# Patient Record
Sex: FEMALE | Race: WHITE | NOT HISPANIC OR LATINO | Employment: OTHER | URBAN - METROPOLITAN AREA
[De-identification: names, ages, dates, MRNs, and addresses within clinical notes are randomized per-mention and may not be internally consistent; named-entity substitution may affect disease eponyms.]

---

## 2017-01-13 ENCOUNTER — COMMUNICATION - HEALTHEAST (OUTPATIENT)
Dept: HEALTH INFORMATION MANAGEMENT | Facility: CLINIC | Age: 52
End: 2017-01-13

## 2017-01-17 ENCOUNTER — TRANSFERRED RECORDS (OUTPATIENT)
Dept: HEALTH INFORMATION MANAGEMENT | Facility: CLINIC | Age: 52
End: 2017-01-17

## 2017-01-18 ENCOUNTER — TRANSFERRED RECORDS (OUTPATIENT)
Dept: HEALTH INFORMATION MANAGEMENT | Facility: CLINIC | Age: 52
End: 2017-01-18

## 2017-05-18 ENCOUNTER — TRANSFERRED RECORDS (OUTPATIENT)
Dept: HEALTH INFORMATION MANAGEMENT | Facility: CLINIC | Age: 52
End: 2017-05-18

## 2017-07-10 ENCOUNTER — PRE VISIT (OUTPATIENT)
Dept: OTOLARYNGOLOGY | Facility: CLINIC | Age: 52
End: 2017-07-10

## 2017-07-10 NOTE — TELEPHONE ENCOUNTER
1.  Date/reason for appt:  7/17/17   Nasal Polyps    2.  Referring provider:  [facility]    3.  Call to patient (Yes / No - short description):  No, referred    4.  Previous care at / records requested from:  [facility]

## 2017-07-17 ENCOUNTER — OFFICE VISIT (OUTPATIENT)
Dept: OTOLARYNGOLOGY | Facility: CLINIC | Age: 52
End: 2017-07-17

## 2017-07-17 VITALS — BODY MASS INDEX: 27.16 KG/M2 | HEIGHT: 65 IN | WEIGHT: 163 LBS

## 2017-07-17 DIAGNOSIS — J33.9 NASAL POLYP: Primary | ICD-10-CM

## 2017-07-17 RX ORDER — FLUTICASONE PROPIONATE 50 MCG
1 SPRAY, SUSPENSION (ML) NASAL
COMMUNITY
Start: 2016-10-25 | End: 2017-08-09

## 2017-07-17 ASSESSMENT — PAIN SCALES - GENERAL: PAINLEVEL: NO PAIN (0)

## 2017-07-17 NOTE — PATIENT INSTRUCTIONS
Plan of care:  We will call you with Ct results and make a plan from there  Clinic contact information:  1. To schedule an appointment call [phone], option 1  2. To talk to the Triage RN call [phone], option 3  3. If you need to speak to Myrna or get a message to your doctor on a Friday, call the triage RN  4. MyrnaRN: [phone]  5. Surgery scheduling:      Joselin Hernandez: [phone]      Shasta Torres: [phone]  6. Fax: [phone]  7. [phone]

## 2017-07-17 NOTE — MR AVS SNAPSHOT
After Visit Summary   2017    Katey Allen    MRN: [id number]           Patient Information     Date Of Birth          1965        Visit Information        Provider Department      2017 5:00 PM Dorothy Garcia MD [facility] Ear Nose and Throat        Today's Diagnoses     Nasal polyp    -  1      Care Instructions    Plan of care:  We will call you with Ct results and make a plan from there  Clinic contact information:  1. To schedule an appointment call [phone], option 1  2. To talk to the Triage RN call [phone], option 3  3. If you need to speak to Myrna or get a message to your doctor on a Friday, call the triage RN  4. MyrnaRN: [phone]  5. Surgery scheduling:      Joselin Hernandez: [phone]      Shasta Torres: [phone]  6. Fax: [phone]  7. [phone]            Follow-ups after your visit        Your next 10 appointments already scheduled     2017  6:00 PM CDT   CT MAXILLOFACIAL W/O CONTRAST with UCCT1   [facility] CT ([facility])    [address] [address] [phone]           Please bring any scans or X-rays taken at other hospitals, if similar tests were done. Also bring a list of your medicines, including vitamins, minerals and over-the-counter drugs. It is safest to leave personal items at home.  Be sure to tell your doctor:   If you have any allergies.   If there s any chance you are pregnant.   If you are breastfeeding.   If you have any special needs.  You do not need to do anything special to prepare.  Please wear loose clothing, such as a sweat suit or jogging clothes. Avoid snaps, zippers and other metal. We may ask you to undress and put on a hospital gown.              Future tests that were ordered for you today     Open Future Orders        Priority Expected Expires Ordered    CT Maxillofacial w/o contrast Routine  2018           "  Who to contact     Please call your clinic at [phone] to:    Ask questions about your health    Make or cancel appointments    Discuss your medicines    Learn about your test results    Speak to your doctor   If you have compliments or concerns about an experience at your clinic, or if you wish to file a complaint, please contact [facility] Patient Relations at [phone] or email us at [email or web address]         Additional Information About Your Visit        Personal Style Finder Information     Personal Style Finder is an electronic gateway that provides easy, online access to your medical records. With Personal Style Finder, you can request a clinic appointment, read your test results, renew a prescription or communicate with your care team.     To sign up for Personal Style Finder visit the website at [email or web address]   You will be asked to enter the access code listed below, as well as some personal information. Please follow the directions to create your username and password.     Your access code is: [email or web address]  Expires: 10/1/2017  6:30 AM     Your access code will  in 90 days. If you need help or a new code, please contact your [facility] Clinic or call [phone] for assistance.        Care EveryWhere ID     This is your Care EveryWhere ID. This could be used by other organizations to access your [place] medical records  [id number]        Your Vitals Were     Height BMI (Body Mass Index)                1.651 m (5' 5\") 27.12 kg/m2           Blood Pressure from Last 3 Encounters:   No data found for BP    Weight from Last 3 Encounters:   17 73.9 kg (163 lb)               Primary Care Provider    None Specified       No primary provider on file.        Equal Access to Services     MAKAYLA HOLLEY : Nicolette Jensen, viviana francisco, jonn ratliff. So [facility] [phone].    ATENCIÓN: Si kasia de jesus, " tiene a colunga disposición servicios gratuitos de asistencia lingüística. [place] [phone].    We comply with applicable federal civil rights laws and [place] laws. We do not discriminate on the basis of race, color, national origin, age, disability sex, sexual orientation or gender identity.            Thank you!     Thank you for choosing [facility] EAR NOSE AND TH[facility]AT  for your care. Our goal is always to provide you with excellent care. Hearing back from our patients is one way we can continue to improve our services. Please take a few minutes to complete the written survey that you may receive in the mail after your visit with us. Thank you!             Your Updated Medication List - Protect others around you: Learn how to safely use, store and throw away your medicines at www.disposemymeds.org.          This list is accurate as of: 7/17/17  5:43 PM.  Always use your most recent med list.                   Brand Name Dispense Instructions for use Diagnosis    fluticasone 50 MCG/ACT spray    FLONASE     Spray 1 spray in nostril

## 2017-07-17 NOTE — LETTER
2017       RE: Katey Allen  [address]  [place] [facility] [place] [address]     Dear Colleague,    Thank you for referring your patient, Katey Allen, to the [facility] EAR NOSE AND THROAT at [facility]. Please see a copy of my visit note below.    Otolaryngology Adult Consultation    Patient: Katey Allen   : 1965     Patient presents with:  Consult:   Chief Complaint   Patient presents with     Consult     nasal polyps        Referring Provider: Av Nazario   Consulting Physician:  Dorothy Garcia MD       Assessment/Plan: Nasal polyp on right, possibly on left.  CT to determine extent of disease.  Based on CT will recommend in office vs. OR for polypectomy and sinus surgery.     HPI: Katey Allen is a 51 year old female seen today in the Otolaryngology Clinic for a history of nasal polyps, difficulty sleeping and breathing.  Symptoms include congestion. Duration of symptoms has been 1.5 years. Previous medical therapies tried and their effects include prednisone twice with temporary effect, nasocort, zyrtec, and decongestant with partial effect. Previous surgery performed includes none. Associated lower respiratory symptoms are congestion.  Also weight gain due to reduced sleep and exercise      No current outpatient prescriptions on file prior to visit.  No current facility-administered medications on file prior to visit.        Allergies   Allergen Reactions     Penicillins        No past medical history on file.      Review of Systems   ENT ROS 2017   Constitutional Weight gain, Unexplained fatigue, Problems with sleep, Unexplained fever or night sweats   Neurology Unexplained weakness, Headache   Ears, Nose, Throat Hearing loss, Ear pain   Musculoskeletal Sore or stiff joints, Swollen legs/feet   Allergy/Immunology Allergies or hay fever        14 point ROS neg other than the symptoms noted above.    Physical Exam:    General Assessment   The  patient is alert, oriented and in no acute distress.   Head/Face/Scalp  Grossly normal.   Ears  Normal canals, auricles and tympanic membranes.   Nose  External nose is straight, skin is normal. Intranasal exam (anterior rhinoscopy) reveals normal moist mucosa, turbinate tissue without edema, erythema or crusting.  Septum mainly in the midline.  Large polyps eminating from R middle meatus, septum deviated, hard to see left to see if there are polyps  Mouth  Oral cavity shows healthy mucosa with out ulceration, masses or other lesions involving the tongue, palate, buccal mucosa, floor of mouth or gingiva.  Dentition in good repair.  Oropharynx with normal tonsils, posterior wall and base of tongue.  Neck  No significant adenopathy, thyroid or salivary gland abnormality.       Again, thank you for allowing me to participate in the care of your patient.      Sincerely,    Dorothy Garcia MD

## 2017-07-17 NOTE — NURSING NOTE
Chief Complaint   Patient presents with     Consult     nasal polyps     Caitlin Bear Medical Assistant

## 2017-07-19 ENCOUNTER — CARE COORDINATION (OUTPATIENT)
Dept: OTOLARYNGOLOGY | Facility: CLINIC | Age: 52
End: 2017-07-19

## 2017-07-19 NOTE — PROGRESS NOTES
CT reviewed by Dr Garcia:  Polyps on both sides, affecting deeper sinus tissue, would recommend a procedure in the OR.   Message left on patient's voicemail, asking for callback.

## 2017-07-19 NOTE — PROGRESS NOTES
Otolaryngology Adult Consultation    Patient: Katey Allen   : 1965     Patient presents with:  Consult:   Chief Complaint   Patient presents with     Consult     nasal polyps        Referring Provider: Av Nazario   Consulting Physician:  Dorothy Garcia MD       Assessment/Plan: Nasal polyp on right, possibly on left.  CT to determine extent of disease.  Based on CT will recommend in office vs. OR for polypectomy and sinus surgery.     HPI: Katey Allen is a 51 year old female seen today in the Otolaryngology Clinic for a history of nasal polyps, difficulty sleeping and breathing.  Symptoms include congestion. Duration of symptoms has been 1.5 years. Previous medical therapies tried and their effects include prednisone twice with temporary effect, nasocort, zyrtec, and decongestant with partial effect. Previous surgery performed includes none. Associated lower respiratory symptoms are congestion.  Also weight gain due to reduced sleep and exercise      No current outpatient prescriptions on file prior to visit.  No current facility-administered medications on file prior to visit.        Allergies   Allergen Reactions     Penicillins        No past medical history on file.      Review of Systems   ENT ROS 2017   Constitutional Weight gain, Unexplained fatigue, Problems with sleep, Unexplained fever or night sweats   Neurology Unexplained weakness, Headache   Ears, Nose, Throat Hearing loss, Ear pain   Musculoskeletal Sore or stiff joints, Swollen legs/feet   Allergy/Immunology Allergies or hay fever        14 point ROS neg other than the symptoms noted above.    Physical Exam:    General Assessment   The patient is alert, oriented and in no acute distress.   Head/Face/Scalp  Grossly normal.   Ears  Normal canals, auricles and tympanic membranes.   Nose  External nose is straight, skin is normal. Intranasal exam (anterior rhinoscopy) reveals normal moist mucosa, turbinate tissue without  edema, erythema or crusting.  Septum mainly in the midline.  Large polyps eminating from R middle meatus, septum deviated, hard to see left to see if there are polyps  Mouth  Oral cavity shows healthy mucosa with out ulceration, masses or other lesions involving the tongue, palate, buccal mucosa, floor of mouth or gingiva.  Dentition in good repair.  Oropharynx with normal tonsils, posterior wall and base of tongue.  Neck  No significant adenopathy, thyroid or salivary gland abnormality.

## 2017-07-20 DIAGNOSIS — J32.4 CHRONIC PANSINUSITIS: Primary | ICD-10-CM

## 2017-07-25 ENCOUNTER — TELEPHONE (OUTPATIENT)
Dept: OTOLARYNGOLOGY | Facility: CLINIC | Age: 52
End: 2017-07-25

## 2017-07-25 NOTE — TELEPHONE ENCOUNTER
7/21/17 Left msg for call back     7/25/17  Left 2nd msg for call back    7/26/17 return call to patient and scheduled surgery for 8/11/17 with Dr Garcia

## 2017-07-26 ENCOUNTER — COMMUNICATION - HEALTHEAST (OUTPATIENT)
Dept: FAMILY MEDICINE | Facility: CLINIC | Age: 52
End: 2017-07-26

## 2017-07-28 ENCOUNTER — OFFICE VISIT - HEALTHEAST (OUTPATIENT)
Dept: FAMILY MEDICINE | Facility: CLINIC | Age: 52
End: 2017-07-28

## 2017-07-28 DIAGNOSIS — J31.0 CHRONIC RHINITIS: ICD-10-CM

## 2017-07-28 DIAGNOSIS — Z01.818 PREOPERATIVE EXAMINATION: ICD-10-CM

## 2017-07-28 ASSESSMENT — MIFFLIN-ST. JEOR: SCORE: 1335.94

## 2017-08-09 ENCOUNTER — ALLIED HEALTH/NURSE VISIT (OUTPATIENT)
Dept: SURGERY | Facility: CLINIC | Age: 52
End: 2017-08-09

## 2017-08-09 RX ORDER — PSEUDOEPHEDRINE HCL 120 MG/1
120 TABLET, FILM COATED, EXTENDED RELEASE ORAL DAILY
COMMUNITY
End: 2017-10-04

## 2017-08-09 RX ORDER — CETIRIZINE HYDROCHLORIDE 10 MG/1
10 TABLET ORAL DAILY
COMMUNITY
End: 2023-05-12 | Stop reason: ALTCHOICE

## 2017-08-09 RX ORDER — TRIAMCINOLONE ACETONIDE 55 UG/1
2 SPRAY, METERED NASAL DAILY
COMMUNITY

## 2017-08-10 ENCOUNTER — ANESTHESIA EVENT (OUTPATIENT)
Dept: SURGERY | Facility: AMBULATORY SURGERY CENTER | Age: 52
End: 2017-08-10

## 2017-08-11 ENCOUNTER — HOSPITAL ENCOUNTER (OUTPATIENT)
Facility: AMBULATORY SURGERY CENTER | Age: 52
End: 2017-08-11
Attending: OTOLARYNGOLOGY

## 2017-08-11 ENCOUNTER — ANESTHESIA (OUTPATIENT)
Dept: SURGERY | Facility: AMBULATORY SURGERY CENTER | Age: 52
End: 2017-08-11

## 2017-08-11 VITALS
HEART RATE: 60 BPM | OXYGEN SATURATION: 98 % | HEIGHT: 65 IN | DIASTOLIC BLOOD PRESSURE: 62 MMHG | SYSTOLIC BLOOD PRESSURE: 103 MMHG | WEIGHT: 163 LBS | RESPIRATION RATE: 16 BRPM | BODY MASS INDEX: 27.16 KG/M2 | TEMPERATURE: 98 F

## 2017-08-11 DIAGNOSIS — J32.4 CHRONIC PANSINUSITIS: Primary | ICD-10-CM

## 2017-08-11 RX ORDER — ECHINACEA PURPUREA EXTRACT 125 MG
1 TABLET ORAL
Qty: 1 BOTTLE | Refills: 3 | Status: SHIPPED | OUTPATIENT
Start: 2017-08-11 | End: 2023-05-12 | Stop reason: ALTCHOICE

## 2017-08-11 RX ORDER — ONDANSETRON 2 MG/ML
INJECTION INTRAMUSCULAR; INTRAVENOUS PRN
Status: DISCONTINUED | OUTPATIENT
Start: 2017-08-11 | End: 2017-08-11

## 2017-08-11 RX ORDER — ONDANSETRON 4 MG/1
4 TABLET, ORALLY DISINTEGRATING ORAL EVERY 30 MIN PRN
Status: DISCONTINUED | OUTPATIENT
Start: 2017-08-11 | End: 2017-08-12 | Stop reason: HOSPADM

## 2017-08-11 RX ORDER — FENTANYL CITRATE 50 UG/ML
25-50 INJECTION, SOLUTION INTRAMUSCULAR; INTRAVENOUS EVERY 5 MIN PRN
Status: DISCONTINUED | OUTPATIENT
Start: 2017-08-11 | End: 2017-08-11 | Stop reason: HOSPADM

## 2017-08-11 RX ORDER — OXYMETAZOLINE HYDROCHLORIDE 0.05 G/100ML
2-3 SPRAY NASAL 2 TIMES DAILY PRN
Qty: 1 BOTTLE | Refills: 0 | Status: SHIPPED | OUTPATIENT
Start: 2017-08-11 | End: 2017-10-04

## 2017-08-11 RX ORDER — SODIUM CHLORIDE, SODIUM LACTATE, POTASSIUM CHLORIDE, CALCIUM CHLORIDE 600; 310; 30; 20 MG/100ML; MG/100ML; MG/100ML; MG/100ML
INJECTION, SOLUTION INTRAVENOUS CONTINUOUS PRN
Status: DISCONTINUED | OUTPATIENT
Start: 2017-08-11 | End: 2017-08-11

## 2017-08-11 RX ORDER — HYDROCODONE BITARTRATE AND ACETAMINOPHEN 5; 325 MG/1; MG/1
1-2 TABLET ORAL EVERY 4 HOURS PRN
Qty: 30 TABLET | Refills: 0 | Status: SHIPPED | OUTPATIENT
Start: 2017-08-11 | End: 2017-10-04

## 2017-08-11 RX ORDER — MEPERIDINE HYDROCHLORIDE 25 MG/ML
12.5 INJECTION INTRAMUSCULAR; INTRAVENOUS; SUBCUTANEOUS
Status: DISCONTINUED | OUTPATIENT
Start: 2017-08-11 | End: 2017-08-12 | Stop reason: HOSPADM

## 2017-08-11 RX ORDER — PROPOFOL 10 MG/ML
INJECTION, EMULSION INTRAVENOUS PRN
Status: DISCONTINUED | OUTPATIENT
Start: 2017-08-11 | End: 2017-08-11

## 2017-08-11 RX ORDER — LIDOCAINE HYDROCHLORIDE AND EPINEPHRINE 10; 10 MG/ML; UG/ML
INJECTION, SOLUTION INFILTRATION; PERINEURAL PRN
Status: DISCONTINUED | OUTPATIENT
Start: 2017-08-11 | End: 2017-08-11 | Stop reason: HOSPADM

## 2017-08-11 RX ORDER — ONDANSETRON 2 MG/ML
4 INJECTION INTRAMUSCULAR; INTRAVENOUS EVERY 30 MIN PRN
Status: DISCONTINUED | OUTPATIENT
Start: 2017-08-11 | End: 2017-08-12 | Stop reason: HOSPADM

## 2017-08-11 RX ORDER — ACETAMINOPHEN 325 MG/1
975 TABLET ORAL ONCE
Status: DISCONTINUED | OUTPATIENT
Start: 2017-08-11 | End: 2017-08-11 | Stop reason: HOSPADM

## 2017-08-11 RX ORDER — OXYMETAZOLINE HYDROCHLORIDE 0.05 G/100ML
SPRAY NASAL PRN
Status: DISCONTINUED | OUTPATIENT
Start: 2017-08-11 | End: 2017-08-11 | Stop reason: HOSPADM

## 2017-08-11 RX ORDER — NALOXONE HYDROCHLORIDE 0.4 MG/ML
.1-.4 INJECTION, SOLUTION INTRAMUSCULAR; INTRAVENOUS; SUBCUTANEOUS
Status: DISCONTINUED | OUTPATIENT
Start: 2017-08-11 | End: 2017-08-12 | Stop reason: HOSPADM

## 2017-08-11 RX ORDER — PROPOFOL 10 MG/ML
INJECTION, EMULSION INTRAVENOUS CONTINUOUS PRN
Status: DISCONTINUED | OUTPATIENT
Start: 2017-08-11 | End: 2017-08-11

## 2017-08-11 RX ORDER — GABAPENTIN 300 MG/1
300 CAPSULE ORAL ONCE
Status: COMPLETED | OUTPATIENT
Start: 2017-08-11 | End: 2017-08-11

## 2017-08-11 RX ORDER — SODIUM CHLORIDE, SODIUM LACTATE, POTASSIUM CHLORIDE, CALCIUM CHLORIDE 600; 310; 30; 20 MG/100ML; MG/100ML; MG/100ML; MG/100ML
INJECTION, SOLUTION INTRAVENOUS CONTINUOUS
Status: DISCONTINUED | OUTPATIENT
Start: 2017-08-11 | End: 2017-08-12 | Stop reason: HOSPADM

## 2017-08-11 RX ORDER — LIDOCAINE HYDROCHLORIDE 20 MG/ML
INJECTION, SOLUTION INFILTRATION; PERINEURAL PRN
Status: DISCONTINUED | OUTPATIENT
Start: 2017-08-11 | End: 2017-08-11

## 2017-08-11 RX ORDER — FENTANYL CITRATE 50 UG/ML
INJECTION, SOLUTION INTRAMUSCULAR; INTRAVENOUS PRN
Status: DISCONTINUED | OUTPATIENT
Start: 2017-08-11 | End: 2017-08-11

## 2017-08-11 RX ORDER — DEXAMETHASONE SODIUM PHOSPHATE 10 MG/ML
INJECTION, SOLUTION INTRAMUSCULAR; INTRAVENOUS PRN
Status: DISCONTINUED | OUTPATIENT
Start: 2017-08-11 | End: 2017-08-11

## 2017-08-11 RX ORDER — FENTANYL CITRATE 50 UG/ML
25-50 INJECTION, SOLUTION INTRAMUSCULAR; INTRAVENOUS
Status: DISCONTINUED | OUTPATIENT
Start: 2017-08-11 | End: 2017-08-11 | Stop reason: HOSPADM

## 2017-08-11 RX ORDER — OXYMETAZOLINE HYDROCHLORIDE 0.05 G/100ML
2 SPRAY NASAL
Status: COMPLETED | OUTPATIENT
Start: 2017-08-11 | End: 2017-08-11

## 2017-08-11 RX ORDER — ACETAMINOPHEN 325 MG/1
975 TABLET ORAL ONCE
Status: COMPLETED | OUTPATIENT
Start: 2017-08-11 | End: 2017-08-11

## 2017-08-11 RX ORDER — HYDROCODONE BITARTRATE AND ACETAMINOPHEN 5; 325 MG/1; MG/1
1 TABLET ORAL ONCE
Status: COMPLETED | OUTPATIENT
Start: 2017-08-11 | End: 2017-08-11

## 2017-08-11 RX ORDER — GABAPENTIN 300 MG/1
300 CAPSULE ORAL ONCE
Status: DISCONTINUED | OUTPATIENT
Start: 2017-08-11 | End: 2017-08-11 | Stop reason: HOSPADM

## 2017-08-11 RX ADMIN — FENTANYL CITRATE 25 MCG: 50 INJECTION, SOLUTION INTRAMUSCULAR; INTRAVENOUS at 12:14

## 2017-08-11 RX ADMIN — PROPOFOL 200 MCG/KG/MIN: 10 INJECTION, EMULSION INTRAVENOUS at 11:22

## 2017-08-11 RX ADMIN — HYDROCODONE BITARTRATE AND ACETAMINOPHEN 1 TABLET: 5; 325 TABLET ORAL at 13:42

## 2017-08-11 RX ADMIN — DEXAMETHASONE SODIUM PHOSPHATE 4 MG: 10 INJECTION, SOLUTION INTRAMUSCULAR; INTRAVENOUS at 11:25

## 2017-08-11 RX ADMIN — Medication 10 MG: at 11:57

## 2017-08-11 RX ADMIN — SODIUM CHLORIDE, SODIUM LACTATE, POTASSIUM CHLORIDE, CALCIUM CHLORIDE: 600; 310; 30; 20 INJECTION, SOLUTION INTRAVENOUS at 11:17

## 2017-08-11 RX ADMIN — Medication 50 MG: at 11:25

## 2017-08-11 RX ADMIN — OXYMETAZOLINE HYDROCHLORIDE 2 SPRAY: 0.05 SPRAY NASAL at 10:07

## 2017-08-11 RX ADMIN — PROPOFOL 160 MG: 10 INJECTION, EMULSION INTRAVENOUS at 11:25

## 2017-08-11 RX ADMIN — FENTANYL CITRATE 50 MCG: 50 INJECTION, SOLUTION INTRAMUSCULAR; INTRAVENOUS at 11:25

## 2017-08-11 RX ADMIN — ACETAMINOPHEN 975 MG: 325 TABLET ORAL at 10:06

## 2017-08-11 RX ADMIN — GABAPENTIN 300 MG: 300 CAPSULE ORAL at 10:06

## 2017-08-11 RX ADMIN — ONDANSETRON 4 MG: 2 INJECTION INTRAMUSCULAR; INTRAVENOUS at 11:25

## 2017-08-11 RX ADMIN — LIDOCAINE HYDROCHLORIDE 100 MG: 20 INJECTION, SOLUTION INFILTRATION; PERINEURAL at 11:25

## 2017-08-11 NOTE — IP AVS SNAPSHOT
MRN:[id number]                      After Visit Summary   8/11/2017    Katey Allen    MRN: [id number]           Thank you!     Thank you for choosing [facility] for your care. Our goal is always to provide you with excellent care. Hearing back from our patients is one way we can continue to improve our services. Please take a few minutes to complete the written survey that you may receive in the mail after you visit with us. Thank you!        Patient Information     Date Of Birth          1965        About your hospital stay     You were admitted on:  August 11, 2017 You last received care in the:  [facility]    You were discharged on:  August 11, 2017       Who to Call     For medical emergencies, please call 911.  For non-urgent questions about your medical care, please call your primary care provider or clinic, [phone]  For questions related to your surgery, please call your surgery clinic        Attending Provider     Provider Specialty    Dorothy Garcia MD Otolaryngology       Primary Care Provider Office Phone # Fax #    [facility] [phone]      After Care Instructions     Diet Instructions       Resume pre procedure diet            Discharge Instructions       Patient to follow up with Dr. Garcia on 8/21/17 as previously scheduled            Discharge Instructions - Lifting restrictions       Lifting Restrictions 15 pounds for 2 weeks            No blowing nose       Nasal precaution: No nose blowing, no straining, cough/sneeze with mouth open                  Your next 10 appointments already scheduled     Aug 21, 2017  2:30 PM CDT   (Arrive by 2:15 PM)   Return Visit with Dorothy Garcia MD   [facility] Ear Nose and Throat ([facility])    [address] [place] [address] [phone]              Further instructions from your care team       [facility] Ambulatory  Surgery and Procedure Center  Home Care Following Anesthesia  For 24 hours after surgery:  1. Get plenty of rest.  A responsible adult must stay with you for at least 24 hours after you leave the surgery center.  2. Do not drive or use heavy equipment.  If you have weakness or tingling, don't drive or use heavy equipment until this feeling goes away.   3. Do not drink alcohol.   4. Avoid strenuous or risky activities.  Ask for help when climbing stairs.  5. You may feel lightheaded.  IF so, sit for a few minutes before standing.  Have someone help you get up.   6. If you have nausea (feel sick to your stomach): Drink only clear liquids such as apple juice, ginger ale, broth or 7-Up.  Rest may also help.  Be sure to drink enough fluids.  Move to a regular diet as you feel able.   7. You may have a slight fever.  Call the doctor if your fever is over 100 F (37.7 C) (taken under the tongue) or lasts longer than 24 hours.  8. You may have a dry mouth, a sore throat, muscle aches or trouble sleeping. These should go away after 24 hours.  9. Do not make important or legal decisions.   If you use hormonal birth control (such as the pill, patch, ring or implants):  You will need a second form of birth control for 7 days (condoms, a diaphragm or contraceptive foam).  While in the surgery center, you received a medicine called Sugammadex.  Hormonal birth control (such as the pill, patch, ring or implants) will not work as well for a week after taking this medicine.         Tips for taking pain medications  To get the best pain relief possible, remember these points:    Take pain medications as directed, before pain becomes severe.    Pain medication can upset your stomach: taking it with food may help.    Constipation is a common side effect of pain medication. Drink plenty of  fluids.    Eat foods high in fiber. Take a stool softener if recommended by your doctor or pharmacist.    Do not drink alcohol, drive or operate  machinery while taking pain medications.    Ask about other ways to control pain, such as with heat, ice or relaxation.    Tylenol/Acetaminophen Consumption  To help encourage the safe use of acetaminophen, the makers of TYLENOL  have lowered the maximum daily dose for single-ingredient Extra Strength TYLENOL  (acetaminophen) products sold in the [place] from 8 pills per day (4,000 mg) to 6 pills per day (3,000 mg). The dosing interval has also changed from 2 pills every 4-6 hours to 2 pills every 6 hours.    If you feel your pain relief is insufficient, you may take Tylenol/Acetaminophen in addition to your narcotic pain medication.     Be careful not to exceed 3,000 mg of Tylenol/Acetaminophen in a 24 hour period from all sources.    If you are taking extra strength Tylenol/acetaminophen (500 mg), the maximum dose is 6 tablets in 24 hours.    If you are taking regular strength acetaminophen (325 mg), the maximum dose is 9 tablets in 24 hours.    Call a doctor for any of the followin. Signs of infection (fever, growing tenderness at the surgery site, a large amount of drainage or bleeding, severe pain, foul-smelling drainage, redness, swelling).  2. It has been over 8 to 10 hours since surgery and you are still not able to urinate (pass water).  3. Headache for over 24 hours.  4. Numbness, tingling or weakness the day after surgery (if you had spinal anesthesia).  Your doctor is:  Dr. Dorothy Garcia, ENT Otolaryngology: [phone]                    Or dial [phone] and ask for the resident on call for:  ENT Otolaryngology  For emergency care, call the:  [facility]:  [phone] (TTY for hearing impaired: [phone])                  Additional Information     If you use hormonal birth control (such as the pill, patch, ring or implants): You'll need a second form of birth control for 7 days (condoms, a diaphragm or contraceptive foam). While in the hospital, you received a medicine  "called Bridion. Your normal birth control will not work as well for a week after taking this medicine.          Pending Results     No orders found from 2017 to 2017.            Admission Information     Date & Time Provider Department Dept. Phone    2017 Dorothy Garcia MD [facility] [phone]      Your Vitals Were     Blood Pressure Temperature Respirations Height Weight Pulse Oximetry    112/74 98  F (36.7  C) (Oral) 16 1.651 m (5' 5\") 73.9 kg (163 lb) 97%    BMI (Body Mass Index)                   27.12 kg/m2           MyChart Information     MemberPlanet is an electronic gateway that provides easy, online access to your medical records. With MemberPlanet, you can request a clinic appointment, read your test results, renew a prescription or communicate with your care team.     To sign up for MemberPlanet visit the website at [email or web address]   You will be asked to enter the access code listed below, as well as some personal information. Please follow the directions to create your username and password.     Your access code is: [email or web address]  Expires: 10/1/2017  6:30 AM     Your access code will  in 90 days. If you need help or a new code, please contact your [facility] or call [phone] for assistance.        Care EveryWhere ID     This is your Care EveryWhere ID. This could be used by other organizations to access your [facility] medical records  [id number]        Equal Access to Services     TAMIKO HOLLEY : Hadii maureen issa hadasho Solindyali, waaxda luqadaha, qaybta kaalmada adeegyada, waxay idiin hayaan adeeg kharash la'aan . So [facility] [phone].    ATENCIÓN: Si habla español, tiene a colunga disposición servicios gratuitos de asistencia lingüística. Llame al [phone].    We comply with applicable federal civil rights laws and [place] laws. We do not discriminate on the basis of race, color, national origin, age, disability sex, " sexual orientation or gender identity.               Review of your medicines      START taking        Dose / Directions    HYDROcodone-acetaminophen 5-325 MG per tablet   Commonly known as:  NORCO   Used for:  Chronic pansinusitis        Dose:  1-2 tablet   Take 1-2 tablets by mouth every 4 hours as needed for other (Moderate to Severe Pain)   Quantity:  30 tablet   Refills:  0       oxymetazoline 0.05 % spray   Commonly known as:  AFRIN NASAL SPRAY   Used for:  Chronic pansinusitis        Dose:  2-3 spray   Spray 2-3 sprays into both nostrils 2 times daily as needed (Nose bleed)   Quantity:  1 Bottle   Refills:  0       sodium chloride 0.65 % nasal spray   Commonly known as:  OCEAN NASAL SPRAY   Used for:  Chronic pansinusitis        Dose:  1 spray   Spray 1 spray into both nostrils every 2 hours (while awake)   Quantity:  1 Bottle   Refills:  3         CONTINUE these medicines which have NOT CHANGED        Dose / Directions    SUDAFED 12 HOUR 120 MG 12 hr tablet   Generic drug:  pseudoePHEDrine        Dose:  120 mg   Take 120 mg by mouth daily   Refills:  0       triamcinolone 55 MCG/ACT Inhaler   Commonly known as:  NASACORT        Dose:  2 spray   Spray 2 sprays into both nostrils daily   Refills:  0       ZYRTEC ALLERGY 10 MG tablet   Generic drug:  cetirizine        Dose:  10 mg   Take 10 mg by mouth daily   Refills:  0            Where to get your medicines      These medications were sent to [facility] [place], [place] - [facility] [address] [address]    Hours:  TRANSPLANT PHONE NUMBER [phone] Phone:  [phone]     oxymetazoline 0.05 % spray    sodium chloride 0.65 % nasal spray         Some of these will need a paper prescription and others can be bought over the counter. Ask your nurse if you have questions.     Bring a paper prescription for each of these medications     HYDROcodone-acetaminophen 5-325 MG per tablet                 Protect others around you: Learn how to safely use, store and throw away your medicines at www.disposemymeds.org.             Medication List: This is a list of all your medications and when to take them. Check marks below indicate your daily home schedule. Keep this list as a reference.      Medications           Morning Afternoon Evening Bedtime As Needed    HYDROcodone-acetaminophen 5-325 MG per tablet   Commonly known as:  NORCO   Take 1-2 tablets by mouth every 4 hours as needed for other (Moderate to Severe Pain)                                oxymetazoline 0.05 % spray   Commonly known as:  AFRIN NASAL SPRAY   Spray 2-3 sprays into both nostrils 2 times daily as needed (Nose bleed)   Last time this was given:  30 mLs on 8/11/2017 12:11 PM                                sodium chloride 0.65 % nasal spray   Commonly known as:  OCEAN NASAL SPRAY   [place] 1 spray into both nostrils every 2 hours (while awake)                                SUDAFED 12 HOUR 120 MG 12 hr tablet   Take 120 mg by mouth daily   Generic drug:  pseudoePHEDrine                                triamcinolone 55 MCG/ACT Inhaler   Commonly known as:  NASACORT   Spray 2 sprays into both nostrils daily                                ZYRTEC ALLERGY 10 MG tablet   Take 10 mg by mouth daily   Generic drug:  cetirizine

## 2017-08-11 NOTE — ANESTHESIA CARE TRANSFER NOTE
Patient: Katey SAUCEDA Reiling    Procedure(s):  Bilateral Polypectomy, Bilateral Maxillary Antrostomy, Ethmoidectomy - Wound Class: II-Clean Contaminated   - Wound Class: II-Clean Contaminated    Diagnosis: Sinusitis, Polyps  Diagnosis Additional Information: No value filed.    Anesthesia Type:   General, ETT     Note:  Airway :Face Mask  Patient transferred to:PACU  Comments: To PACU  VSS Drowsy but responsive approriately ot verbal stim.    PAtent aitrway w/o adjunct.  Spont respirs 14bpm.   Report to Burton KEYS      Vitals: (Last set prior to Anesthesia Care Transfer)    CRNA VITALS  8/11/2017 1247 - 8/11/2017 1319      8/11/2017             Resp Rate (set): 10                Electronically Signed By: RICO Wyatt CRNA  August 11, 2017  1:19 PM

## 2017-08-11 NOTE — IP AVS SNAPSHOT
[facility]    [address] [place] [address]    Phone:  [phone]    Fax:  [phone]                                       After Visit Summary   8/11/2017    Katey Allen    MRN: [id number]           After Visit Summary Signature Page     I have received my discharge instructions, and my questions have been answered. I have discussed any challenges I see with this plan with the nurse or doctor.    ..........................................................................................................................................  Patient/Patient Representative Signature      ..........................................................................................................................................  Patient Representative Print Name and Relationship to Patient    ..................................................               ................................................  Date                                            Time    ..........................................................................................................................................  Reviewed by Signature/Title    ...................................................              ..............................................  Date                                                            Time

## 2017-08-11 NOTE — BRIEF OP NOTE
[facility] Brief Operative Note    Pre-operative diagnosis: Sinusitis, Polyps   Post-operative diagnosis Same   Procedure: Image guided bilateral polypectomy, total ethmoidectomy, maxillary antrostomy.   Surgeon: Dorothy Garcia MD   Assistants(s): Dr. Juárez   Estimated blood loss: Less than 100 ml    Specimens: Sinus contents and left culture   Findings: Extensive polypoid inflammation and copious mucous.

## 2017-08-11 NOTE — ANESTHESIA POSTPROCEDURE EVALUATION
Patient: Katey SAUCEDA Reiling    Procedure(s):  Bilateral Polypectomy, Bilateral Maxillary Antrostomy, Ethmoidectomy - Wound Class: II-Clean Contaminated   - Wound Class: II-Clean Contaminated    Diagnosis:Sinusitis, Polyps  Diagnosis Additional Information: No value filed.    Anesthesia Type:  General, ETT    Note:  Anesthesia Post Evaluation    Patient location during evaluation: PACU  Patient participation: Able to fully participate in evaluation  Level of consciousness: awake and alert  Pain management: adequate  Airway patency: patent  Cardiovascular status: hemodynamically stable  Respiratory status: acceptable  Hydration status: stable  PONV: none     Anesthetic complications: None          Last vitals:  Vitals:    08/11/17 0945   BP: 112/74   Resp: 16   Temp: 36.7  C (98  F)   SpO2: 97%         Electronically Signed By: Jani Lanier MD  August 11, 2017  1:15 PM

## 2017-08-11 NOTE — ANESTHESIA PREPROCEDURE EVALUATION
Anesthesia Evaluation     .             ROS/MED HX    ENT/Pulmonary:     (+)Intermittent asthma Treatment: Inhaler prn,  , . .    Neurologic:  - neg neurologic ROS     Cardiovascular:  - neg cardiovascular ROS       METS/Exercise Tolerance:     Hematologic:  - neg hematologic  ROS       Musculoskeletal:  - neg musculoskeletal ROS       GI/Hepatic:  - neg GI/hepatic ROS       Renal/Genitourinary:  - ROS Renal section negative       Endo:  - neg endo ROS       Psychiatric:  - neg psychiatric ROS       Infectious Disease:  - neg infectious disease ROS       Malignancy:      - no malignancy   Other:    - neg other ROS                 Physical Exam  Normal systems: cardiovascular, pulmonary and dental    Airway   Mallampati: I  TM distance: >3 FB  Neck ROM: full    Dental     Cardiovascular   Rhythm and rate: regular and normal      Pulmonary    breath sounds clear to auscultation                    Anesthesia Plan      History & Physical Review  History and physical reviewed and following examination; no interval change.    ASA Status:  2 .    NPO Status:  > 8 hours    Plan for General and ETT with Intravenous and Propofol induction. Maintenance will be TIVA.    PONV prophylaxis:  Ondansetron (or other 5HT-3) and Dexamethasone or Solumedrol       Postoperative Care  Postoperative pain management:  IV analgesics.      Consents  Anesthetic plan, risks, benefits and alternatives discussed with:  Patient..                          .

## 2017-08-11 NOTE — OP NOTE
PREOPERATIVE DIAGNOSIS:  Chronic sinusitis with nasal polyposis.      POSTOPERATIVE DIAGNOSIS:  Chronic sinusitis with nasal polyposis.      PROCEDURE:  Bilateral total ethmoidectomy and maxillary antrostomy with tissue removal.  This was under image guidance.      SURGEON:  Dorothy Garcia MD     ASSISTANT:  Lex Pimentel MD     ANESTHESIA:  General endotracheal.      BLOOD LOSS:  75 mL.      REPLACEMENT:  Crystalloid.      COMPLICATIONS:  None.      FINDINGS:  Extensive polyposis and copious mucoid secretions.      INDICATIONS FOR PROCEDURE:  The patient has had a history of chronic rhinosinusitis with nasal polyposis.  She has been treated medically without significant response.  She presents for the above-stated procedure.      OPERATIVE PROCEDURE:  After risks and benefits were explained, the patient's signed consent was obtained.  She was taken to the operating room and placed supine on the table.  General anesthesia was administered.  She was endotracheally intubated, sterilely draped.  The image guidance system is placed on her forehead and registration, calibration and verification for accuracy is performed.  Image guidance was used throughout the procedure due to the extensive nature of her nasal polyps and involvement of the skull base and the total ethmoid area.  First, on the left side, the polyps are removed using a shaver.  A curette is used to open the uncinate and the edges also removed with the shaver.  Dissection through the anterior ethmoid bulla and anterior ethmoid air cells is performed using a curette and shaving technique.  The posterior ethmoids are entered through a pathway through the basal lamella.  The edge of the superior turbinate has polypoid degeneration and multiple polyps are found within the sphenoethmoidal recess.  These are removed using a shaver.  Once the procedure is complete, copious irrigation is performed and Nasopore is placed as a stent for the middle turbinate.  The  procedure is repeated on the right side in a similar fashion.  There are large polyps emanating from the middle meatus and posterior meatus on the right.  I removed the posterior ethmoid polyps using a shaver and opened into the sphenoethmoidal recess through an approach through the ethmoids and intranasal.  The maxillary sinus is identified, enlarged and opened and polypoid tissue is removed from the sinus itself.  There is also polypoid tissue removed from the left maxillary sinus.  After copious irrigation, Nasopore is placed.         WILL ARIZMENDI MD             D: 2017 13:30   T: 2017 13:59   MT: maddie      Name:     KERA RUBIO   MRN:      [id number]        Account:        [id number]   :      1965           Procedure Date: 2017      Document: [id number]

## 2017-08-11 NOTE — DISCHARGE INSTRUCTIONS
[facility]  Home Care Following Anesthesia  For 24 hours after surgery:  1. Get plenty of rest.  A responsible adult must stay with you for at least 24 hours after you leave the surgery center.  2. Do not drive or use heavy equipment.  If you have weakness or tingling, don't drive or use heavy equipment until this feeling goes away.   3. Do not drink alcohol.   4. Avoid strenuous or risky activities.  Ask for help when climbing stairs.  5. You may feel lightheaded.  IF so, sit for a few minutes before standing.  Have someone help you get up.   6. If you have nausea (feel sick to your stomach): Drink only clear liquids such as apple juice, ginger ale, broth or 7-Up.  Rest may also help.  Be sure to drink enough fluids.  Move to a regular diet as you feel able.   7. You may have a slight fever.  Call the doctor if your fever is over 100 F (37.7 C) (taken under the tongue) or lasts longer than 24 hours.  8. You may have a dry mouth, a sore throat, muscle aches or trouble sleeping. These should go away after 24 hours.  9. Do not make important or legal decisions.   If you use hormonal birth control (such as the pill, patch, ring or implants):  You will need a second form of birth control for 7 days (condoms, a diaphragm or contraceptive foam).  While in the surgery center, you received a medicine called Sugammadex.  Hormonal birth control (such as the pill, patch, ring or implants) will not work as well for a week after taking this medicine.         Tips for taking pain medications  To get the best pain relief possible, remember these points:    Take pain medications as directed, before pain becomes severe.    Pain medication can upset your stomach: taking it with food may help.    Constipation is a common side effect of pain medication. Drink plenty of  fluids.    Eat foods high in fiber. Take a stool softener if recommended by your doctor or pharmacist.    Do not drink alcohol,  drive or operate machinery while taking pain medications.    Ask about other ways to control pain, such as with heat, ice or relaxation.    Tylenol/Acetaminophen Consumption  To help encourage the safe use of acetaminophen, the makers of TYLENOL  have lowered the maximum daily dose for single-ingredient Extra Strength TYLENOL  (acetaminophen) products sold in the [place] from 8 pills per day (4,000 mg) to 6 pills per day (3,000 mg). The dosing interval has also changed from 2 pills every 4-6 hours to 2 pills every 6 hours.    If you feel your pain relief is insufficient, you may take Tylenol/Acetaminophen in addition to your narcotic pain medication.     Be careful not to exceed 3,000 mg of Tylenol/Acetaminophen in a 24 hour period from all sources.    If you are taking extra strength Tylenol/acetaminophen (500 mg), the maximum dose is 6 tablets in 24 hours.    If you are taking regular strength acetaminophen (325 mg), the maximum dose is 9 tablets in 24 hours.    Call a doctor for any of the followin. Signs of infection (fever, growing tenderness at the surgery site, a large amount of drainage or bleeding, severe pain, foul-smelling drainage, redness, swelling).  2. It has been over 8 to 10 hours since surgery and you are still not able to urinate (pass water).  3. Headache for over 24 hours.  4. Numbness, tingling or weakness the day after surgery (if you had spinal anesthesia).  Your doctor is:  Dr. Dorothy Garcia, ENT Otolaryngology: [phone]                    Or dial [phone] and ask for the resident on call for:  ENT Otolaryngology  For emergency care, call the:  [facility]:  [phone] (TTY for hearing impaired: [phone])

## 2017-08-13 LAB
BACTERIA SPEC CULT: ABNORMAL
Lab: ABNORMAL
MICRO REPORT STATUS: ABNORMAL
SPECIMEN SOURCE: ABNORMAL

## 2017-08-14 LAB — COPATH REPORT: NORMAL

## 2017-08-23 ENCOUNTER — OFFICE VISIT (OUTPATIENT)
Dept: OTOLARYNGOLOGY | Facility: CLINIC | Age: 52
End: 2017-08-23

## 2017-08-23 ENCOUNTER — RECORDS - HEALTHEAST (OUTPATIENT)
Dept: ADMINISTRATIVE | Facility: OTHER | Age: 52
End: 2017-08-23

## 2017-08-23 DIAGNOSIS — J33.9 NASAL POLYP: Primary | ICD-10-CM

## 2017-08-23 NOTE — PATIENT INSTRUCTIONS
Please follow up in 4-6 weeks to see Dr Garcia. For questions or concerns please call the RN care coordinator.   Ángel Barry RN  [phone]

## 2017-08-23 NOTE — LETTER
8/23/2017       RE: Katey Allen  [address]  [facility] [address]     Dear Colleague,    Thank you for referring your patient, Katey Allen, to the [facility] E[facility]AT at [facility]. Please see a copy of my visit note below.    HISTORY OF PRESENT ILLNESS:  Katey is status post functional endoscopic sinus surgery for chronic sinusitis and nasal polyposis.  She had fairly extensive polyps at the time of surgery.  I placed Nasopore in her middle meatus.  She states that she had some discomfort for about a week.  She has had some thick nasal drainage but now her sense of smell is beginning to return.      PHYSICAL EXAMINATION:  She is examined.      PROCEDURE:  Topical anesthetic decongestant solution is applied, and nasal endoscopy is performed.  I used suction and alligator forceps to remove Nasopore from the middle meatal region.  The ethmoid labyrinth is healing nicely.  No signs of purulence or recurrence of polyps at this point.      ASSESSMENT AND PLAN:  This is a patient with chronic sinusitis and nasal polyposis.  For now, she is going to continue saline irrigations and use Nasacort topical nasal steroid spray.  I will see her back again in 4-6 weeks.  If she has any evidence of recurrence of polyps, we may want to switch to budesonide irrigations.       Again, thank you for allowing me to participate in the care of your patient.      Sincerely,    Dorothy Garcia MD

## 2017-08-23 NOTE — PROGRESS NOTES
HISTORY OF PRESENT ILLNESS:  Katey is status post functional endoscopic sinus surgery for chronic sinusitis and nasal polyposis.  She had fairly extensive polyps at the time of surgery.  I placed Nasopore in her middle meatus.  She states that she had some discomfort for about a week.  She has had some thick nasal drainage but now her sense of smell is beginning to return.      PHYSICAL EXAMINATION:  She is examined.      PROCEDURE:  Topical anesthetic decongestant solution is applied, and nasal endoscopy is performed.  I used suction and alligator forceps to remove Nasopore from the middle meatal region.  The ethmoid labyrinth is healing nicely.  No signs of purulence or recurrence of polyps at this point.      ASSESSMENT AND PLAN:  This is a patient with chronic sinusitis and nasal polyposis.  For now, she is going to continue saline irrigations and use Nasacort topical nasal steroid spray.  I will see her back again in 4-6 weeks.  If she has any evidence of recurrence of polyps, we may want to switch to budesonide irrigations.

## 2017-08-23 NOTE — MR AVS SNAPSHOT
After Visit Summary   2017    Katey Allen    MRN: [id number]           Patient Information     Date Of Birth          1965        Visit Information        Provider Department      2017 1:00 PM Dorothy Garcia MD [facility] Nose and Throat        Today's Diagnoses     Nasal polyp    -  1      Care Instructions    Please follow up in 4-6 weeks to see Dr Garcia. For questions or concerns please call the RN care coordinator.   Ángel Barry ,RN  [phone]              Follow-ups after your visit        Your next 10 appointments already scheduled     Oct 04, 2017  2:45 PM CDT   (Arrive by 2:30 PM)   Return Visit with MD BERNADINE Keller [facility] ([place] [facility])    [address] [place] [id number] [phone]              Who to contact     Please call your clinic at [phone] to:    Ask questions about your health    Make or cancel appointments    Discuss your medicines    Learn about your test results    Speak to your doctor   If you have compliments or concerns about an experience at your clinic, or if you wish to file a complaint, please contact [facility] Patient Relations at [phone] or email us at [email or web address]         Additional Information About Your Visit        MyChart Information     Spoonfed is an electronic gateway that provides easy, online access to your medical records. With Spoonfed, you can request a clinic appointment, read your test results, renew a prescription or communicate with your care team.     To sign up for Tre[employer]igencet visit the website at [email or web address]   You will be asked to enter the access code listed below, as well as some personal information. Please follow the directions to create your username and password.     Your access code is: [email or web address]  Expires: 10/1/2017  6:30 AM     Your access code will  in 90  days. If you need help or a new code, please contact your [facility] or call [phone] for assistance.        Care EveryWhere ID     This is your Care EveryWhere ID. This could be used by other organizations to access your [place] medical records  [id number]         Blood Pressure from Last 3 Encounters:   08/11/17 103/62    Weight from Last 3 Encounters:   08/11/17 73.9 kg (163 lb)   07/17/17 73.9 kg (163 lb)              We Performed the Following     NASAL ENDOSCOPY, DIAGNOSTIC        Primary Care Provider Office Phone # Fax #    [facility] [phone]       [address] [place] [address]        Equal Access to Services     TAMIKO HOLLEY : Hadii aad ku hadasho Sote, waaxda luqadaha, qaybta kaalmada adeegyada, jonn pittman. So [facility] [phone].    ATENCIÓN: Si habla español, tiene a colunga disposición servicios gratuitos de asistencia lingüística. Llame al [phone].    We comply with applicable federal civil rights laws and [place] laws. We do not discriminate on the basis of race, color, national origin, age, disability sex, sexual orientation or gender identity.            Thank you!     Thank you for choosing [facility] EAR [facility]SE [facility] TH[facility]AT  for your care. Our goal is always to provide you with excellent care. Hearing back from our patients is one way we can continue to improve our services. Please take a few minutes to complete the written survey that you may receive in the mail after your visit with us. Thank you!             Your Updated Medication List - Protect others around you: Learn how to safely use, store and throw away your medicines at www.disposemymeds.org.          This list is accurate as of: 8/23/17  1:25 PM.  Always use your most recent med list.                   Brand Name Dispense Instructions for use Diagnosis    HYDROcodone-acetaminophen 5-325 MG per tablet    NORCO    30  tablet    Take 1-2 tablets by mouth every 4 hours as needed for other (Moderate to Severe Pain)    Chronic pansinusitis       oxymetazoline 0.05 % spray    AFRIN NASAL SPRAY    1 Bottle    Spray 2-3 sprays into both nostrils 2 times daily as needed (Nose bleed)    Chronic pansinusitis       sodium chloride 0.65 % nasal spray    OCEAN NASAL SPRAY    1 Bottle    Spray 1 spray into both nostrils every 2 hours (while awake)    Chronic pansinusitis       SUDAFED 12 HOUR 120 MG 12 hr tablet   Generic drug:  pseudoePHEDrine      Take 120 mg by mouth daily        triamcinolone 55 MCG/ACT Inhaler    NASACORT     Spray 2 sprays into both nostrils daily        ZYRTEC ALLERGY 10 MG tablet   Generic drug:  cetirizine      Take 10 mg by mouth daily

## 2017-10-04 ENCOUNTER — OFFICE VISIT (OUTPATIENT)
Dept: OTOLARYNGOLOGY | Facility: CLINIC | Age: 52
End: 2017-10-04

## 2017-10-04 ENCOUNTER — RECORDS - HEALTHEAST (OUTPATIENT)
Dept: ADMINISTRATIVE | Facility: OTHER | Age: 52
End: 2017-10-04

## 2017-10-04 VITALS — WEIGHT: 168 LBS | BODY MASS INDEX: 27.99 KG/M2 | HEIGHT: 65 IN

## 2017-10-04 DIAGNOSIS — J45.30 MILD PERSISTENT ASTHMA, UNSPECIFIED WHETHER COMPLICATED: Primary | ICD-10-CM

## 2017-10-04 RX ORDER — BUDESONIDE 0.5 MG/2ML
0.5 INHALANT ORAL DAILY
Qty: 120 ML | Refills: 3 | Status: SHIPPED | OUTPATIENT
Start: 2017-10-04 | End: 2018-08-13

## 2017-10-04 ASSESSMENT — PAIN SCALES - GENERAL: PAINLEVEL: MILD PAIN (2)

## 2017-10-04 NOTE — LETTER
10/4/2017       RE: Katey Allen  [address]  [facility] [address]     Dear Colleague,    Thank you for referring your patient, Katey Allen, to the [facility] EAR NOSE AND THROAT at [facility]. Please see a copy of my visit note below.    HISTORY OF PRESENT ILLNESS:  Katey Allen returns.  She is status post endoscopic sinus surgery for chronic sinusitis and nasal polyposis.  She was doing very well at her first postoperative visit and then she states that about a week later, she began to feel a bit more congested.  She also went through an episode of what she thought was acute sinus infection.  It lasted for two weeks and then resolved.  She is feeling better now except for the mild obstruction on the right.      PHYSICAL EXAMINATION:  She is carefully examined.        PROCEDURE NOTE:  I performed nasal endoscopy to look for evidence of polyps causing her obstruction. Endoscopy is required for her examination.  Topical anesthetic decongestant solution is applied.  A 0-degree rigid endoscope is passed into the left nasal cavity.  The ethmoid labyrinth and maxillary antrum are clear.  No polyps, purulence or abnormal tissue.  On the right, she has mild bowing of the septum and within the middle meatus in the anterior ethmoid region, she has mild swelling and polypoid degeneration but no gumaro polyps or purulence.      ASSESSMENT AND PLAN:  Katey has persistent chronic rhinosinusitis with nasal polyposis.  We need to increase the amount of steroid in her maintenance therapy so we will switch her to budesonide irrigations.  She is comfortable with this plan.  I will see her back in three months.       Sincerely,    Dorothy Garcia MD

## 2017-10-04 NOTE — PATIENT INSTRUCTIONS
1.  You were seen in the ENT Clinic today by Dr. Garcia.  If you have any questions or concerns after your appointment, please call [phone].  Press option #1 for scheduling related needs.  Press option #3 for Nurse advice.  2.  A prescription for Budesonide irrigations has been sent to your preferred pharmacy.      Janiya SCHWARTZ, RN  [facility] ENT   Head & Neck Surgery

## 2017-10-04 NOTE — PROGRESS NOTES
HISTORY OF PRESENT ILLNESS:  Katey Allen returns.  She is status post endoscopic sinus surgery for chronic sinusitis and nasal polyposis.  She was doing very well at her first postoperative visit and then she states that about a week later, she began to feel a bit more congested.  She also went through an episode of what she thought was acute sinus infection.  It lasted for two weeks and then resolved.  She is feeling better now except for the mild obstruction on the right.      PHYSICAL EXAMINATION:  She is carefully examined.        PROCEDURE NOTE:  I performed nasal endoscopy to look for evidence of polyps causing her obstruction. Endoscopy is required for her examination.  Topical anesthetic decongestant solution is applied.  A 0-degree rigid endoscope is passed into the left nasal cavity.  The ethmoid labyrinth and maxillary antrum are clear.  No polyps, purulence or abnormal tissue.  On the right, she has mild bowing of the septum and within the middle meatus in the anterior ethmoid region, she has mild swelling and polypoid degeneration but no gumaro polyps or purulence.      ASSESSMENT AND PLAN:  Katey has persistent chronic rhinosinusitis with nasal polyposis.  We need to increase the amount of steroid in her maintenance therapy so we will switch her to budesonide irrigations.  She is comfortable with this plan.  I will see her back in three months.

## 2017-10-04 NOTE — NURSING NOTE
"Chief Complaint   Patient presents with     RECHECK     Check up after polypectomy, feels as though right side is closing up again      Height 1.651 m (5' 5\"), weight 76.2 kg (168 lb).    Duy Noel    "

## 2017-10-04 NOTE — MR AVS SNAPSHOT
After Visit Summary   10/4/2017    Katey Allen    MRN: [id number]           Patient Information     Date Of Birth          1965        Visit Information        Provider Department      10/4/2017 2:45 PM Dorothy Garcia MD [facility]        Today's Diagnoses     Mild persistent asthma, unspecified whether complicated    -  1      Care Instructions    1.  You were seen in the ENT Clinic today by Dr. Garcia.  If you have any questions or concerns after your appointment, please call [phone].  Press option #1 for scheduling related needs.  Press option #3 for Nurse advice.  2.  A prescription for Budesonide irrigations has been sent to your preferred pharmacy.      Janiya SCHWARTZ, RN  [facility]   Head & Neck Surgery                 Follow-ups after your visit        Who to contact     Please call your clinic at [phone] to:    Ask questions about your health    Make or cancel appointments    Discuss your medicines    Learn about your test results    Speak to your doctor   If you have compliments or concerns about an experience at your clinic, or if you wish to file a complaint, please contact [facility] Patient Relations at [phone] or email us at [email or web address]         Additional Information About Your Visit        MyChart Information     [employer] gives you secure access to your electronic health record. If you see a primary care provider, you can also send messages to your care team and make appointments. If you have questions, please call your primary care clinic.  If you do not have a primary care provider, please call [phone] and they will assist you.      [employer] is an electronic gateway that provides easy, online access to your medical records. With [employer], you can request a clinic appointment, read your test results, renew a prescription or communicate with your care team.     To access  "your existing account, please contact your [facility] or call [phone] for assistance.        Care EveryWhere ID     This is your Care EveryWhere ID. This could be used by other organizations to access your [place] medical records  [id number]        Your Vitals Were     Height BMI (Body Mass Index)                1.651 m (5' 5\") 27.96 kg/m2           Blood Pressure from Last 3 Encounters:   08/11/17 103/62    Weight from Last 3 Encounters:   10/04/17 76.2 kg (168 lb)   08/11/17 73.9 kg (163 lb)   07/17/17 73.9 kg (163 lb)              We Performed the Following     NASAL ENDOSCOPY, DIAGNOSTIC          Today's Medication Changes          These changes are accurate as of: 10/4/17 11:59 PM.  If you have any questions, ask your nurse or doctor.               Start taking these medicines.        Dose/Directions    budesonide 0.5 MG/2ML neb solution   Commonly known as:  PULMICORT   Used for:  Mild persistent asthma, unspecified whether complicated   Started by:  Dorothy Garcia MD        Dose:  0.5 mg   Take 2 mLs (0.5 mg) by nebulization daily for 240 doses   Quantity:  120 mL   Refills:  3            Where to get your medicines      These medications were sent to [place] [address] [place] [id number] [place] [id number]     Phone:  [phone]     budesonide 0.5 MG/2ML neb solution                Primary Care Provider Office Phone # Fax #    [facility] [phone]       [facility] [address]        Equal Access to Services     MAKAYLA HOLLEY : Hadii maureen issa hadasho Sote, waaxda luqadaha, qaybta kaalmajonn wild idiin hayaan adeeg kharash la'aan . So [facility] [phone].    ATENCIÓN: Si habla español, tiene a colunga disposición servicios gratuitos de asistencia lingüística. Llame al [phone].    We comply with applicable federal civil rights laws and [place] laws. We do not " discriminate on the basis of race, color, national origin, age, disability, sex, sexual orientation, or gender identity.            Thank you!     Thank you for choosing [facility]  for your care. Our goal is always to provide you with excellent care. Hearing back from our patients is one way we can continue to improve our services. Please take a few minutes to complete the written survey that you may receive in the mail after your visit with us. Thank you!             Your Updated Medication List - Protect others around you: Learn how to safely use, store and throw away your medicines at www.disposemymeds.org.          This list is accurate as of: 10/4/17 11:59 PM.  Always use your most recent med list.                   Brand Name Dispense Instructions for use Diagnosis    budesonide 0.5 MG/2ML neb solution    PULMICORT    120 mL    Take 2 mLs (0.5 mg) by nebulization daily for 240 doses    Mild persistent asthma, unspecified whether complicated       sodium chloride 0.65 % nasal spray    OCEAN NASAL SPRAY    1 Bottle    Spray 1 spray into both nostrils every 2 hours (while awake)    Chronic pansinusitis       triamcinolone 55 MCG/ACT Inhaler    NASACORT     Spray 2 sprays into both nostrils daily        ZYRTEC ALLERGY 10 MG tablet   Generic drug:  cetirizine      Take 10 mg by mouth daily

## 2018-01-21 ENCOUNTER — HEALTH MAINTENANCE LETTER (OUTPATIENT)
Age: 53
End: 2018-01-21

## 2018-08-13 ENCOUNTER — TELEPHONE (OUTPATIENT)
Dept: OTOLARYNGOLOGY | Facility: CLINIC | Age: 53
End: 2018-08-13

## 2018-08-13 DIAGNOSIS — J45.30 MILD PERSISTENT ASTHMA, UNSPECIFIED WHETHER COMPLICATED: ICD-10-CM

## 2018-08-13 DIAGNOSIS — J32.4 CHRONIC PANSINUSITIS: Primary | ICD-10-CM

## 2018-08-13 RX ORDER — BUDESONIDE 0.5 MG/2ML
0.5 INHALANT ORAL 2 TIMES DAILY
Qty: 120 ML | Refills: 11 | Status: SHIPPED | OUTPATIENT
Start: 2018-08-13 | End: 2022-10-24

## 2018-08-13 NOTE — TELEPHONE ENCOUNTER
[facility] Call Center    Phone Message    May a detailed message be left on voicemail: yes    Reason for Call: Medication Refill Request    Has the patient contacted the pharmacy for the refill? Yes   Name of medication being requested: Budesonide  Provider who prescribed the medication: Jose  Pharmacy: [facility] (Note - new pharmacy)   Date medication is needed: asap         Action Taken: Message routed to:  [facility] ([facility] sameer

## 2019-11-05 ENCOUNTER — OFFICE VISIT - HEALTHEAST (OUTPATIENT)
Dept: FAMILY MEDICINE | Facility: CLINIC | Age: 54
End: 2019-11-05

## 2019-11-05 ENCOUNTER — COMMUNICATION - HEALTHEAST (OUTPATIENT)
Dept: FAMILY MEDICINE | Facility: CLINIC | Age: 54
End: 2019-11-05

## 2019-11-05 DIAGNOSIS — Z00.00 ANNUAL PHYSICAL EXAM: ICD-10-CM

## 2019-11-05 LAB
ALBUMIN SERPL-MCNC: 4 G/DL (ref 3.5–5)
ALP SERPL-CCNC: 78 U/L (ref 45–120)
ALT SERPL W P-5'-P-CCNC: 16 U/L (ref 0–45)
AST SERPL W P-5'-P-CCNC: 18 U/L (ref 0–40)
BILIRUB DIRECT SERPL-MCNC: 0.2 MG/DL
BILIRUB SERPL-MCNC: 0.6 MG/DL (ref 0–1)
CHOLEST SERPL-MCNC: 253 MG/DL
FASTING STATUS PATIENT QL REPORTED: YES
FASTING STATUS PATIENT QL REPORTED: YES
GLUCOSE BLD-MCNC: 99 MG/DL (ref 70–99)
HDLC SERPL-MCNC: 67 MG/DL
LDLC SERPL CALC-MCNC: 163 MG/DL
PROT SERPL-MCNC: 7.2 G/DL (ref 6–8)
TRIGL SERPL-MCNC: 114 MG/DL

## 2019-11-05 ASSESSMENT — MIFFLIN-ST. JEOR: SCORE: 1354.78

## 2019-11-09 ENCOUNTER — HOSPITAL ENCOUNTER (OUTPATIENT)
Dept: MAMMOGRAPHY | Facility: CLINIC | Age: 54
Discharge: HOME | End: 2019-11-09

## 2019-11-09 DIAGNOSIS — Z00.00 ANNUAL PHYSICAL EXAM: ICD-10-CM

## 2019-12-06 ENCOUNTER — COMMUNICATION - HEALTHEAST (OUTPATIENT)
Dept: FAMILY MEDICINE | Facility: CLINIC | Age: 54
End: 2019-12-06

## 2019-12-06 DIAGNOSIS — R06.2 WHEEZING: ICD-10-CM

## 2019-12-09 ENCOUNTER — COMMUNICATION - HEALTHEAST (OUTPATIENT)
Dept: FAMILY MEDICINE | Facility: CLINIC | Age: 54
End: 2019-12-09

## 2019-12-10 ENCOUNTER — RECORDS - HEALTHEAST (OUTPATIENT)
Dept: ADMINISTRATIVE | Facility: OTHER | Age: 54
End: 2019-12-10

## 2020-03-10 ENCOUNTER — HEALTH MAINTENANCE LETTER (OUTPATIENT)
Age: 55
End: 2020-03-10

## 2020-09-19 ENCOUNTER — COMMUNICATION - HEALTHEAST (OUTPATIENT)
Dept: FAMILY MEDICINE | Facility: CLINIC | Age: 55
End: 2020-09-19

## 2020-12-27 ENCOUNTER — HEALTH MAINTENANCE LETTER (OUTPATIENT)
Age: 55
End: 2020-12-27

## 2021-04-24 ENCOUNTER — HEALTH MAINTENANCE LETTER (OUTPATIENT)
Age: 56
End: 2021-04-24

## 2021-05-31 VITALS — BODY MASS INDEX: 26.99 KG/M2 | HEIGHT: 65 IN | WEIGHT: 162 LBS

## 2021-06-03 VITALS
BODY MASS INDEX: 27.32 KG/M2 | DIASTOLIC BLOOD PRESSURE: 63 MMHG | WEIGHT: 164 LBS | HEART RATE: 79 BPM | OXYGEN SATURATION: 94 % | RESPIRATION RATE: 16 BRPM | HEIGHT: 65 IN | SYSTOLIC BLOOD PRESSURE: 106 MMHG

## 2021-06-03 NOTE — PROGRESS NOTES
FEMALE ADULT PREVENTIVE EXAM    ASSESSMENT:   Katey Allen  was seen today for annual exam.  1. Annual physical exam  Lipid [place]    albuterol (PROAIR HFA;PROVENTIL HFA;VENTOLIN HFA) 90 mcg/actuation inhaler    Cologuard    Glucose    Hepatic Profile     Admits to drinking more liberally and usually more than 2 drinks.   has recently quit drinking.  Aware about the need to cut down.  With concerns of using alcohol liberally, we did do liver function test today which patient wanted to pursue.    PLAN:   begin progressive daily aerobic exercise program, follow a low fat, low cholesterol diet, reduce exposure to stress, continue current healthy lifestyle patterns and return for routine annual checkups      CHIEF COMPLAINT:      Chief Complaint   Patient presents with     Establish Care     Pt here today to establish care with a new provider     Annual Exam     Fasting labs, Last Pap 10/25/16 NL. HPV Neg       SUBJECTIVE:  Katey Allen is a 54 y.o. female   presents today for an annual physical examination.   She denies any acute concerns       Katey Allen  has a past medical history  has no past medical history on file.    Surgeries:      Past Surgical History:   Procedure Laterality Date     NASAL POLYP SURGERY           Family History:  family history includes Atrial fibrillation in her mother; Heart disease in her maternal grandfather and mother; Heart failure in her mother; Lung cancer in her maternal grandmother; No Medical Problems in her father.    Social History:   reports that she has never smoked. She has never used smokeless tobacco. She reports current alcohol use. She reports that she does not use drugs.     Medications:    Current Outpatient Medications on File Prior to Visit   Medication Sig Dispense Refill     triamcinolone (NASACORT) 55 mcg nasal inhaler 2 sprays into each nostril daily.       cetirizine (ZYRTEC) 10 MG tablet Take 10 mg by mouth daily.       [DISCONTINUED]  "albuterol (PROAIR HFA;PROVENTIL HFA;VENTOLIN HFA) 90 mcg/actuation inhaler Inhale 2 puffs every 6 (six) hours as needed for wheezing.       [DISCONTINUED] pseudoephedrine (SUDAFED) 120 mg 12 hr tablet Take 120 mg by mouth daily.       No current facility-administered medications on file prior to visit.          Allergies:    Allergies   Allergen Reactions     Penicillins          HEALTH MAINTENANCE:  Pap- 2016 NORMAL  Mammography: 2018 NORMAL      Healthy Habits:   Regular Exercise: Yes  Sunscreen Use: Yes  Healthy Diet: Yes  Dental Visits Regularly: Yes  Seat Belt: Yes  Sexually active: No  Self Breast Exam Monthly:No  Hemoccults: No  Flex Sig: No  Colonoscopy: No  Lipid Profile: Yes  Glucose Screen: Yes  Prevention of Osteoporosis: N/A  Last Dexa: N/A  Guns at Home:  No      REVIEW OF SYSTEMS:  Complete head to toe review of systems is otherwise negative except as above.  Katey Allen denies any fever, cough, loss of appetite, heart issues, GI issues, new skin lesions, endocrine issues.  She informs me she feels well.      OBJECTIVE:  VITAL SIGNS: /63 (Patient Site: Left Arm, Patient Position: Sitting, Cuff Size: Adult Large)   Pulse 79   Resp 16   Ht 5' 5.32\" (1.659 m)   Wt 164 lb (74.4 kg)   SpO2 94%   BMI 27.03 kg/m      GENERAL:  Patient alert, in no acute distress.  EYES: PERRLA. Extraoccular movements intact, pupils equal, reactive to light and accommodation.  Normal conjunctiva and lids.  ENT:  Hearing grossly normal.  Normal appearance to ears and nose.  Bilateral TM s, external canals, oropharynx normal. Normal lips, gums and teeth.   NECK:  Supple, without thyromegaly or mass.  RESP:  Clear to auscultation without crackles, wheezes or distress.  Normal respiratory effort.   CV:  Regular rate and rhythm without murmurs, rubs or gallops.  Normal carotid, abdominal aorta, femoral and pedal pulses.  No varicosities or edema.  ABDOMEN:  Soft, non-tender, without hepatosplenomegaly, masses, or " hernias.   BREASTS:  Nontender, without masses, nipple discharge, erythema, or axillary adenopathy.    LYMPHATIC: Normal palpation of neck, and axilla..  No lymphadenopathy.   NEURO:   motor & sensory function all intact.  DTR and reflexes normal.  PSYCHIATRIC:  Alert & oriented with normal mood and affect.  Good judgment and insight.  SKIN:  Normal inspection and palpation.  MUSCULOSKELETAL: Normal gait and station.

## 2021-06-04 NOTE — TELEPHONE ENCOUNTER
Central PA team  [phone]  Pool: HE PA MED ([id number])          PA has been initiated.       PA form completed and faxed insurance via Cover My Meds     Key:  [id number] - PA Case ID: [id number]     Medication:    Albuterol Sulfate  (90 Base)MCG/ACT aerosol    Insurance:  [facility]        Response will be received via fax and may take up to 5-10 business days depending on plan

## 2021-06-04 NOTE — TELEPHONE ENCOUNTER
Prior Authorization Request  Who s requesting:  Patient  Pharmacy Name and Location: [facility]  Medication Name: albuterol (PROAIR HFA;PROVENTIL HFA;VENTOLIN HFA) 90 mcg/actuation inhaler  Insurance Plan: [employer]  Insurance Member ID Number:  [id number]  Informed patient that prior authorizations can take up to 10 business days for response:   Yes  Okay to leave a detailed message: No    Please reply to pts [employer] message once answer is received

## 2021-06-04 NOTE — TELEPHONE ENCOUNTER
Please call pharmacy to check if generic albuterol is covered.  I have sent a new prescription, please also inform patient    Dina Almaguer MD  12/9/2019

## 2021-06-11 NOTE — TELEPHONE ENCOUNTER
Pt states she got an actual colon[facility] just [facility]. Closing this encounter.

## 2021-06-11 NOTE — TELEPHONE ENCOUNTER
Patient had Cologurd test delivered but it has not been returned.  Please call patient to remind them the kit will be expiring and will not be processed if received after 11/5/2020.     Thank you

## 2021-06-12 NOTE — PROGRESS NOTES
Assessment/Plan:      Visit for Preoperative Exam.     Patient approved for surgery with general or local anesthesia.     No NSAID or aspirin use 7 days prior to surgery.       Subjective:     Scheduled Procedure: Nasal Surgery-polyp removal   Surgery Date:  8/11/17  Surgery Location:  [facility] (fax [phone])  Surgeon:  Dr. Garcia    Current Outpatient Prescriptions   Medication Sig Dispense Refill     cetirizine (ZYRTEC) 10 MG tablet Take 10 mg by mouth daily.       pseudoephedrine (SUDAFED) 120 mg 12 hr tablet Take 120 mg by mouth daily.       triamcinolone (NASACORT) 55 mcg nasal inhaler 2 sprays into each nostril daily.       No current facility-administered medications for this visit.        Allergies   Allergen Reactions     Penicillins        Immunization History   Administered Date(s) Administered     Influenza, seasonal,quad inj 36+ mos 10/25/2016     Tdap 02/03/2014       Patient Active Problem List   Diagnosis     Allergic rhinitis     Overweight       No past medical history on file.    Social History     Social History     Marital status:      Spouse name: N/A     Number of children: N/A     Years of education: N/A     Occupational History     Not on file.     Social History Main Topics     Smoking status: Never Smoker     Smokeless tobacco: Not on file     Alcohol use Yes     Drug use: No     Sexual activity: Not on file     Other Topics Concern     Not on file     Social History Narrative       No past surgical history on file.    History of Present Illness  Recent Health  Fever: no  Chills: no  Fatigue: no  Chest Pain: no  Cough: no  Dyspnea: no  Urinary Frequency: no  Nausea: no  Vomiting: no  Diarrhea: no  Abdominal Pain: no  Easy Bruising: no  Lower Extremity Swelling: no  Poor Exercise Tolerance: no    Most recent Health Maintenance Visit:  October 2016    Pertinent History  Prior Anesthesia: no  Previous Anesthesia Reaction:  n/a  Diabetes: no  Cardiovascular Disease:  "no  Pulmonary Disease: yes-asthma mild intermittent: triggers: allergies and exercise  Renal Disease: no  GI Disease: no  Sleep Apnea: no  Thromboembolic Problems: no  Clotting Disorder: no  Bleeding Disorder: no  Transfusion Reaction: no  Impaired Immunity: no  Steroid use in the last 6 months: 2 prescriptions each 1 week long; 4 months ago  Frequent Aspirin use: no    No Family history of anesthesia reaction    Social history of none    After surgery, the patient plans to recover at home with family.    Review of Systems  A 12 point comprehensive review of systems was negative except as noted.     Difficulty breathing through nose      Objective:         Vitals:    07/28/17 0839   BP: 116/68   Pulse: 76   Resp: 16   Weight: 162 lb (73.5 kg)   Height: 5' 4.7\" (1.643 m)       Physical Exam:  General Appearance: Alert, cooperative, no distress, appears stated age  Head: Normocephalic, without obvious abnormality, atraumatic  Eyes: PERRL, conjunctiva/corneas clear, EOM's intact  Ears: Normal TM's and external ear canals, both ears  Nose: Nares normal, septum midline,mucosa normal, no drainage  Throat: Lips, mucosa, and tongue normal; teeth and gums normal  Neck: Supple, symmetrical, trachea midline, no adenopathy;  thyroid: not enlarged, symmetric, no tenderness/mass/nodules;   Lungs: Clear to auscultation bilaterally, respirations unlabored  Heart: Regular rate and rhythm, S1 and S2 normal, no murmur  Abdomen: Soft, non-tender, bowel sounds active all four quadrants,  no masses, no organomegaly  Extremities: Extremities normal, atraumatic, no cyanosis or edema  Skin: Skin color, texture, turgor normal, no rashes or lesions  Lymph nodes: Cervical, supraclavicular, and axillary nodes normal  Neurologic: Normal       Andreina Gomez MD  7/28/2017  Electronically signed       "

## 2021-06-16 NOTE — TELEPHONE ENCOUNTER
Telephone Encounter by Andreina Lujan at 12/11/2019  8:14 AM     Author: Andreina Lujan Service: -- Author Type: --    Filed: 12/11/2019  8:15 AM Encounter Date: 12/9/2019 Status: Signed    : Andreina Lujan APPROVED:    Approval start date: 11/09/2019  Approval end date:  12/08/2020    Pharmacy has been notified of approval and will contact patient when medication is ready for pickup.

## 2021-06-17 NOTE — PATIENT INSTRUCTIONS - HE
Patient Instructions by Dina Almaguer MD at 11/5/2019  7:50 AM     Author: Dina Almaguer MD Service: -- Author Type: Physician    Filed: 11/5/2019  8:46 AM Encounter Date: 11/5/2019 Status: Signed    : Dina Almaguer MD (Physician)         Patient Education     Prevention Guidelines, Women Ages 50 to 64  Screening tests and vaccines are an important part of managing your health. A screening test is done to find possible disorders or diseases in people who don't have any symptoms. The goal is to find a disease early so lifestyle changes can be made and you can be watched more closely to reduce the risk of disease, or to detect it early enough to treat it most effectively. Screening tests are not considered diagnostic, but are used to determine if more testing is needed. Health counseling is essential, too. Below are guidelines for these, for women ages 50 to 64. Talk with your healthcare provider to make sure youre up to date on what you need.  Screening Who needs it How often   Type 2 diabetes or prediabetes All women beginning at age 45 and women without symptoms at any age who are overweight or obese and have 1 or more additional risk factors for diabetes. At  least every 3 years   Type 2 diabetes or prediabetes All women diagnosed with gestational diabetes Lifelong testing every 3 years   Type 2 diabetes All women with prediabetes Every year   Alcohol misuse All women in this age group At routine exams   Blood pressure All women in this age group Yearly checkup if your blood pressure is normal  Normal blood pressure is less than 120/80 mm Hg  If your blood pressure reading is higher than normal, follow the advice of your healthcare provider   Breast cancer All women at average risk in this age group Yearly mammogram should be done until age 54. At age 55, you can switch to every other year or choose to continue yearly.  All women should know the possible benefits and risks of breast cancer  screening with mammograms.   Cervical cancer All women in this age group, except women who have had a complete hysterectomy Pap test every 3 years or Pap test with human papillomavirus (HPV) test every 5 years   Chlamydia Women at increased risk for infection At routine exams   Colorectal cancer All women at average risk in this age group Multiple tests are available and are used at different times. Possible tests include:    Flexible sigmoidoscopy every 5 years, or    Colonoscopy every 10 years, or    CT colonography (virtual colonoscopy) every 5 years, or    Yearly fecal occult blood test, or    Yearly fecal immunochemical test every year, or    Stool DNA test, every 3 years  If you choose a test other than a colonoscopy and have an abnormal test result, you will need to follow up with a colonoscopy. Screening advice varies among expert groups. Talk with your healthcare provider about which tests are best for you.  Some people should be screened using a different schedule because of their personal or family health history. Talk with your healthcare provider about your health history.   Depression All women in this age group At routine exams   Gonorrhea Sexually active women at increased risk for infection At routine exams   Hepatitis C Anyone at increased risk; 1 time for those born between 1945 and 1965 At routine exams   High cholesterol or triglycerides All women in this age group who are at risk for coronary artery disease At least every 5 years   HIV All women At routine exams   Lung cancer Adults age 55 to 80 who have smoked Yearly screening in smokers with 30 pack-year history of smoking or who quit within 15 years   Obesity All women in this age group At routine exams   Osteoporosis Women who are postmenopausal Ask your healthcare provider   Syphilis Women at increased risk for infection - talk with your healthcare provider At routine exams   Tuberculosis Women at increased risk for infection - talk with  your healthcare provider Ask your healthcare provider   Vision All women in this age group Ask your healthcare provider   Vaccine Who needs it How often   Chickenpox (varicella) All women in this age group who have no record of this infection or vaccine 2 doses; the second dose should be given at least 4 weeks after the first dose   Hepatitis A Women at increased risk for infection - talk with your healthcare provider 2 doses given at least 6 months apart   Hepatitis B Women at increased risk for infection - talk with your healthcare provider 3 doses over 6 months; second dose should be given 1 month after the first dose; the third dose should be given at least 2 months after the second dose and at least 4 months after the first dose   Haemophilus influenzae Type B (HIB) Women at increased risk for infection - talk with your healthcare provider 1 to 3 doses   Influenza (flu) All women in this age group Once a year   Measles, mumps, rubella (MMR) Women in this age group through their late 50s who have no record of these infections or vaccines 1 dose   Meningococcal Women at increased risk for infection - talk with your healthcare provider 1 or more doses   Pneumococcal conjugate vaccine (PCV13) and pneumococcal polysaccharide vaccine (PPSV23) Women at increased risk for infection - talk with your healthcare provider PCV13: 1 dose ages 19 to 65 (protects against 13 types of pneumococcal bacteria)  PPSV23: 1 to 2 doses through age 64, or 1 dose at 65 or older (protects against 23 types of pneumococcal bacteria)   Tetanus/diphtheria/pertussis (Td/Tdap) booster All women in this age group Td every 10 years, or a 1-time dose of Tdap instead of a Td booster after age 18, then Td every 10 years   Zoster All women ages 60 and older 1 dose   Counseling Who needs it How often   BRCA gene mutation testing for breast and ovarian cancer susceptibility Women with increased risk for having gene mutation When your risk is known    Breast cancer and chemoprevention Women at high risk for breast cancer When your risk is known   Diet and exercise Women who are overweight or obese When diagnosed, and then at routine exams   Sexually transmitted infection prevention Women at increased risk for infection - talk with your healthcare provider At routine exams   Use of daily aspirin Women ages 55 and up in this age group who are at risk for cardiovascular health problems such as stroke When your risk is known   Use of tobacco and the health effects it can cause All women in this age group Every exam   1 American Cancer Society  Date Last Reviewed: 1/26/2016 2000-2019 [employer] [address] [place] [id number]. All rights reserved. This information is not intended as a substitute for professional medical care. Always follow your healthcare professional's instructions.                         Here is the equivalent of drinks that can be taken safely-    Remember. A glass of dinner wine, a bottle of beer, and a shot of spirits are all equivalent in alcohol contents. A standard drink is:    A 12-ounce bottle or can of regular beer.   A 5-ounce glass of dinner wine.   A one and 1/2 ounce of 80 proof distilled spirits. (Either straight or in a mixed drink.)

## 2021-09-30 ENCOUNTER — ANCILLARY PROCEDURE (OUTPATIENT)
Dept: MAMMOGRAPHY | Facility: CLINIC | Age: 56
End: 2021-09-30
Attending: FAMILY MEDICINE
Payer: COMMERCIAL

## 2021-09-30 DIAGNOSIS — Z12.31 SCREENING MAMMOGRAM, ENCOUNTER FOR: ICD-10-CM

## 2021-09-30 PROCEDURE — 77067 SCR MAMMO BI INCL CAD: CPT | Mod: TC | Performed by: INTERNAL MEDICINE

## 2021-09-30 PROCEDURE — 77063 BREAST TOMOSYNTHESIS BI: CPT | Mod: TC | Performed by: INTERNAL MEDICINE

## 2021-10-09 ENCOUNTER — HEALTH MAINTENANCE LETTER (OUTPATIENT)
Age: 56
End: 2021-10-09

## 2021-10-26 ENCOUNTER — OFFICE VISIT (OUTPATIENT)
Dept: FAMILY MEDICINE | Facility: CLINIC | Age: 56
End: 2021-10-26
Payer: COMMERCIAL

## 2021-10-26 VITALS
HEART RATE: 80 BPM | WEIGHT: 156.8 LBS | DIASTOLIC BLOOD PRESSURE: 74 MMHG | OXYGEN SATURATION: 95 % | BODY MASS INDEX: 25.2 KG/M2 | SYSTOLIC BLOOD PRESSURE: 120 MMHG | HEIGHT: 66 IN

## 2021-10-26 DIAGNOSIS — Z13.220 SCREENING FOR LIPID DISORDERS: ICD-10-CM

## 2021-10-26 DIAGNOSIS — Z12.4 SCREENING FOR CERVICAL CANCER: ICD-10-CM

## 2021-10-26 DIAGNOSIS — Z11.4 SCREENING FOR HIV (HUMAN IMMUNODEFICIENCY VIRUS): ICD-10-CM

## 2021-10-26 DIAGNOSIS — Z11.59 NEED FOR HEPATITIS C SCREENING TEST: ICD-10-CM

## 2021-10-26 DIAGNOSIS — R53.83 OTHER FATIGUE: ICD-10-CM

## 2021-10-26 DIAGNOSIS — J33.9 NASAL POLYP: ICD-10-CM

## 2021-10-26 DIAGNOSIS — Z00.00 ROUTINE GENERAL MEDICAL EXAMINATION AT A HEALTH CARE FACILITY: Primary | ICD-10-CM

## 2021-10-26 LAB
ALBUMIN SERPL-MCNC: 4 G/DL (ref 3.5–5)
ALP SERPL-CCNC: 64 U/L (ref 45–120)
ALT SERPL W P-5'-P-CCNC: 17 U/L (ref 0–45)
ANION GAP SERPL CALCULATED.3IONS-SCNC: 12 MMOL/L (ref 5–18)
AST SERPL W P-5'-P-CCNC: 20 U/L (ref 0–40)
BASOPHILS # BLD AUTO: 0 10E3/UL (ref 0–0.2)
BASOPHILS NFR BLD AUTO: 1 %
BILIRUB SERPL-MCNC: 0.7 MG/DL (ref 0–1)
BUN SERPL-MCNC: 15 MG/DL (ref 8–22)
CALCIUM SERPL-MCNC: 9.5 MG/DL (ref 8.5–10.5)
CHLORIDE BLD-SCNC: 103 MMOL/L (ref 98–107)
CHOLEST SERPL-MCNC: 262 MG/DL
CO2 SERPL-SCNC: 25 MMOL/L (ref 22–31)
CREAT SERPL-MCNC: 0.72 MG/DL (ref 0.6–1.1)
EOSINOPHIL # BLD AUTO: 0.8 10E3/UL (ref 0–0.7)
EOSINOPHIL NFR BLD AUTO: 14 %
ERYTHROCYTE [DISTWIDTH] IN BLOOD BY AUTOMATED COUNT: 13 % (ref 10–15)
FASTING STATUS PATIENT QL REPORTED: YES
GFR SERPL CREATININE-BSD FRML MDRD: >90 ML/MIN/1.73M2
GLUCOSE BLD-MCNC: 98 MG/DL (ref 70–125)
HCT VFR BLD AUTO: 41.4 % (ref 35–47)
HDLC SERPL-MCNC: 77 MG/DL
HGB BLD-MCNC: 13.6 G/DL (ref 11.7–15.7)
HIV 1+2 AB+HIV1 P24 AG SERPL QL IA: NEGATIVE
IMM GRANULOCYTES # BLD: 0 10E3/UL
IMM GRANULOCYTES NFR BLD: 0 %
LDLC SERPL CALC-MCNC: 167 MG/DL
LYMPHOCYTES # BLD AUTO: 1.5 10E3/UL (ref 0.8–5.3)
LYMPHOCYTES NFR BLD AUTO: 26 %
MCH RBC QN AUTO: 31.1 PG (ref 26.5–33)
MCHC RBC AUTO-ENTMCNC: 32.9 G/DL (ref 31.5–36.5)
MCV RBC AUTO: 95 FL (ref 78–100)
MONOCYTES # BLD AUTO: 0.4 10E3/UL (ref 0–1.3)
MONOCYTES NFR BLD AUTO: 7 %
NEUTROPHILS # BLD AUTO: 2.9 10E3/UL (ref 1.6–8.3)
NEUTROPHILS NFR BLD AUTO: 52 %
PLATELET # BLD AUTO: 198 10E3/UL (ref 150–450)
POTASSIUM BLD-SCNC: 4.1 MMOL/L (ref 3.5–5)
PROT SERPL-MCNC: 6.8 G/DL (ref 6–8)
RBC # BLD AUTO: 4.38 10E6/UL (ref 3.8–5.2)
SODIUM SERPL-SCNC: 140 MMOL/L (ref 136–145)
TRIGL SERPL-MCNC: 89 MG/DL
TSH SERPL DL<=0.005 MIU/L-ACNC: 2.14 UIU/ML (ref 0.3–5)
WBC # BLD AUTO: 5.6 10E3/UL (ref 4–11)

## 2021-10-26 PROCEDURE — 86803 HEPATITIS C AB TEST: CPT | Performed by: FAMILY MEDICINE

## 2021-10-26 PROCEDURE — 36415 COLL VENOUS BLD VENIPUNCTURE: CPT | Performed by: FAMILY MEDICINE

## 2021-10-26 PROCEDURE — 90682 RIV4 VACC RECOMBINANT DNA IM: CPT | Performed by: FAMILY MEDICINE

## 2021-10-26 PROCEDURE — 80061 LIPID PANEL: CPT | Performed by: FAMILY MEDICINE

## 2021-10-26 PROCEDURE — 87624 HPV HI-RISK TYP POOLED RSLT: CPT | Performed by: FAMILY MEDICINE

## 2021-10-26 PROCEDURE — 80050 GENERAL HEALTH PANEL: CPT | Performed by: FAMILY MEDICINE

## 2021-10-26 PROCEDURE — 90471 IMMUNIZATION ADMIN: CPT | Performed by: FAMILY MEDICINE

## 2021-10-26 PROCEDURE — 88142 CYTOPATH C/V THIN LAYER: CPT | Performed by: FAMILY MEDICINE

## 2021-10-26 PROCEDURE — 87389 HIV-1 AG W/HIV-1&-2 AB AG IA: CPT | Performed by: FAMILY MEDICINE

## 2021-10-26 PROCEDURE — 99396 PREV VISIT EST AGE 40-64: CPT | Mod: 25 | Performed by: FAMILY MEDICINE

## 2021-10-26 RX ORDER — ALBUTEROL SULFATE 90 UG/1
2 AEROSOL, METERED RESPIRATORY (INHALATION)
COMMUNITY
Start: 2019-11-05 | End: 2023-05-12

## 2021-10-26 ASSESSMENT — MIFFLIN-ST. JEOR: SCORE: 1325.05

## 2021-10-26 NOTE — PROGRESS NOTES
"  ASSESSMENT/PLAN:       ICD-10-CM    1. Routine general medical examination at a health care facility  Z00.00 Comprehensive metabolic panel (BMP + Alb, Alk Phos, ALT, AST, Total. Bili, TP)     Comprehensive metabolic panel (BMP + Alb, Alk Phos, ALT, AST, Total. Bili, TP)   2. Screening for HIV (human immunodeficiency virus)  Z11.4 HIV Antigen Antibody Combo     HIV Antigen Antibody Combo   3. Need for hepatitis C screening test  Z11.59 Hepatitis C Screen Reflex to HCV RNA Quant and Genotype     Hepatitis C Screen Reflex to HCV RNA Quant and Genotype   4. Screening for cervical cancer  Z12.4 Pap Screen with HPV - recommended age 30 - 65 years     CANCELED: HPV High Risk Types DNA Cervical   5. Screening for lipid disorders  Z13.220 Lipid panel     Lipid panel   6. Other fatigue  R53.83 CBC with platelets and differential     TSH with free T4 reflex     CBC with platelets and differential     TSH with free T4 reflex   7. Nasal polyp  J33.9 Otolaryngology Referral     Medication making: Patient here for a routine medical exam.  She had quit drinking and has lost weight which she has gained again.  Counseled to cut down on drinking.  With symptoms of nasal blockage, referral to ENT.  Patient has had polypectomy    Patient has been advised of split billing requirements and indicates understanding: Yes  COUNSELING:  Reviewed preventive health counseling, as reflected in patient instructions       Regular exercise       Healthy diet/nutrition       Vision screening       Alcohol Use       Consider Hep C screening for all patients one time for ages 18-79 years       HIV screeninx in teen years, 1x in adult years, and at intervals if high risk       (Hetal)menopause management       Advance Care Planning    Estimated body mass index is 27.03 kg/m  as calculated from the following:    Height as of 19: 1.659 m (5' 5.32\").    Weight as of 19: 74.4 kg (164 lb).        She reports that she has never smoked. She has " never used smokeless tobacco.   SUBJECTIVE:   CC: Katey Allen is an 55 year old woman who presents for preventive health visit.   Chief Complaint   Patient presents with     Physical     Pt is fasting today, pap smear today, wants cholestoral checked, pt will get flu shot today.    Patient would also like to get a referral to ENT.  She has a history of nasal polyps and has had a polypectomy.  She feels that her left nose is again getting blocked.  She does use Nasacort once a day and antihistamines as needed.    Patient has been advised of split billing requirements and indicates understanding: Yes  Healthy Habits:     Getting at least 3 servings of Calcium per day:  Yes    Bi-annual eye exam:  Yes    Dental care twice a year:  Yes    Sleep apnea or symptoms of sleep apnea:  Daytime drowsiness and Excessive snoring    Diet:  Regular (no restrictions)    Frequency of exercise:  1 day/week    Duration of exercise:  15-30 minutes    Taking medications regularly:  Yes    Barriers to taking medications:  None    Medication side effects:  Not applicable    PHQ-2 Total Score: 0    Additional concerns today:  No      Today's PHQ-2 Score:   PHQ-2 ( 1999 Pfizer) 10/26/2021   Q1: Little interest or pleasure in doing things 0   Q2: Feeling down, depressed or hopeless 0   PHQ-2 Score 0   Q1: Little interest or pleasure in doing things Not at all   Q2: Feeling down, depressed or hopeless Not at all   PHQ-2 Score 0       Abuse: Current or Past (Physical, Sexual or Emotional) - No  Do you feel safe in your environment? Yes    Have you ever done Advance Care Planning? (For example, a Health Directive, POLST, or a discussion with a medical provider or your loved ones about your wishes): No, advance care planning information given to patient to review.  Patient plans to discuss their wishes with loved ones or provider.      Social History     Tobacco Use     Smoking status: Never Smoker     Smokeless tobacco: Never Used    Substance Use Topics     Alcohol use: Yes     If you drink alcohol do you typically have >3 drinks per day or >7 drinks per week? Yes      Alcohol Use 10/26/2021   Prescreen: >3 drinks/day or >7 drinks/week? Yes   Prescreen: >3 drinks/day or >7 drinks/week? -   AUDIT SCORE  12     AUDIT - Alcohol Use Disorders Identification Test - Reproduced from the World Health Organization Audit 2001 (Second Edition) 10/26/2021   1.  How often do you have a drink containing alcohol? 4 or more times a week   2.  How many drinks containing alcohol do you have on a typical day when you are drinking? 1 or 2   3.  How often do you have five or more drinks on one occasion? Monthly   4.  How often during the last year have you found that you were not able to stop drinking once you had started? Monthly   5.  How often during the last year have you failed to do what was normally expected of you because of drinking? Less than monthly   6.  How often during the last year have you needed a first drink in the morning to get yourself going after a heavy drinking session? Never   7.  How often during the last year have you had a feeling of guilt or remorse after drinking? Monthly   8.  How often during the last year have you been unable to remember what happened the night before because of your drinking? Less than monthly   9.  Have you or someone else been injured because of your drinking? No   10. Has a relative, friend, doctor or other health care worker been concerned about your drinking or suggested you cut down? No   TOTAL SCORE 12       Reviewed orders with patient.  Reviewed health maintenance and updated orders accordingly - Yes  BP Readings from Last 3 Encounters:   10/26/21 120/74   11/05/19 106/63   08/11/17 103/62    Wt Readings from Last 3 Encounters:   10/26/21 71.1 kg (156 lb 12.8 oz)   11/05/19 74.4 kg (164 lb)   10/04/17 76.2 kg (168 lb)                    Breast Cancer Screening:  Any new diagnosis of family breast,  ovarian, or bowel cancer? No    FHS-7:   Breast CA Risk Assessment (FHS-7) 9/30/2021   Did any of your first-degree relatives have breast or ovarian cancer? No   Did any of your relatives have bilateral breast cancer? No   Did any man in your family have breast cancer? No   Did any woman in your family have breast and ovarian cancer? No   Did any woman in your family have breast cancer before age 50 y? No   Do you have 2 or more relatives with breast and/or ovarian cancer? No   Do you have 2 or more relatives with breast and/or bowel cancer? No   Mammogram Screening: Recommended annual mammography  Pertinent mammograms are reviewed under the imaging tab.    History of abnormal Pap smear: NO - age 30-65 PAP every 5 years with negative HPV co-testing recommended  PAP / HPV 10/25/2016   PAP Negative for squamous intraepithelial lesion or malignancy  Electronically signed by Tasha Eduardo CT (ASCP) on 11/1/2016 at 11:15 AM       Reviewed and updated as needed this visit by clinical staff  Tobacco  Allergies  Meds              Reviewed and updated as needed this visit by Provider                Past Medical History:   Diagnosis Date     Allergic rhinitis      Anxiety      Chronic sinusitis      Hearing problem      Nasal polyps      Sinus problem      Uncomplicated asthma       Past Surgical History:   Procedure Laterality Date     ANTROSTOMY NASAL Bilateral 8/11/2017    Procedure: ANTROSTOMY NASAL;;  Surgeon: Dorothy Garcia MD;  Location: [facility] OR     ENDOSCOPIC POLYPECTOMY NASAL Bilateral 8/11/2017    Procedure: ENDOSCOPIC POLYPECTOMY NASAL;  Bilateral Polypectomy, Bilateral Maxillary Antrostomy, Ethmoidectomy;  Surgeon: Dorothy Garcia MD;  Location: [facility] OR     NASAL POLYP SURGERY       NASAL/SINUS POLYPECTOMY       NO HISTORY OF SURGERY         Review of Systems  Constitutional, HEENT, cardiovascular, pulmonary, gi and gu systems are negative, except as otherwise noted.       OBJECTIVE:   /74 (BP Location:  "Left arm, Patient Position: Sitting, Cuff Size: Adult Regular)   Pulse 80   Ht 1.664 m (5' 5.5\")   Wt 71.1 kg (156 lb 12.8 oz)   SpO2 95%   BMI 25.70 kg/m    Physical Exam  GENERAL APPEARANCE: healthy, alert and no distress  EYES: Eyes grossly normal to inspection, PERRL and conjunctivae and sclerae normal  HENT: ear canals and TM's normal, nose and mouth without ulcers or lesions, oropharynx clear and oral mucous membranes moist  NECK: no adenopathy, no asymmetry, masses, or scars and thyroid normal to palpation  RESP: lungs clear to auscultation - no rales, rhonchi or wheezes  CV: regular rate and rhythm, normal S1 S2, no S3 or S4, no murmur, click or rub, no peripheral edema and peripheral pulses strong  ABDOMEN: soft, nontender, no hepatosplenomegaly, no masses and bowel sounds normal   (female): normal female external genitalia, normal urethral meatus, vaginal mucosal atrophy noted, normal cervix, adnexae, and uterus without masses or abnormal discharge  MS: no musculoskeletal defects are noted and gait is age appropriate without ataxia  SKIN: no suspicious lesions or rashes  NEURO: Normal strength and tone, sensory exam grossly normal, mentation intact and speech normal  PSYCH: mentation appears normal and affect normal/bright    Diagnostic Test Results:  Labs reviewed in Epic  Results for orders placed or performed in visit on 10/26/21 (from the past 24 hour(s))   CBC with platelets and differential    Narrative    The following orders were created for panel order CBC with platelets and differential.  Procedure                               Abnormality         Status                     ---------                               -----------         ------                     CBC with platelets and d...[[id number]]  Abnormal            Final result                 Please view results for these tests on the individual orders.   CBC with platelets and differential   Result Value Ref [facility]    WBC Count 5.6 4.0 " - 11.0 10e3/uL    RBC Count 4.38 3.80 - 5.20 10e6/uL    Hemoglobin 13.6 11.7 - 15.7 g/dL    Hematocrit 41.4 35.0 - 47.0 %    MCV 95 78 - 100 fL    MCH 31.1 26.5 - 33.0 pg    MCHC 32.9 31.5 - 36.5 g/dL    RDW 13.0 10.0 - 15.0 %    Platelet Count 198 150 - 450 10e3/uL    % Neutrophils 52 %    % Lymphocytes 26 %    % Monocytes 7 %    % Eosinophils 14 %    % Basophils 1 %    % Immature Granulocytes 0 %    Absolute Neutrophils 2.9 1.6 - 8.3 10e3/uL    Absolute Lymphocytes 1.5 0.8 - 5.3 10e3/uL    Absolute Monocytes 0.4 0.0 - 1.3 10e3/uL    Absolute Eosinophils 0.8 (H) 0.0 - 0.7 10e3/uL    Absolute Basophils 0.0 0.0 - 0.2 10e3/uL    Absolute Immature Granulocytes 0.0 <=0.0 10e3/uL         Counseling Resources:  ATP IV Guidelines  Pooled Cohorts Equation Calculator  Breast Cancer Risk Calculator  BRCA-Related Cancer Risk Assessment: FHS-7 Tool  FRAX Risk Assessment  ICSI Preventive Guidelines  Dietary Guidelines for Americans, 2010  USDA's MyPlate  ASA Prophylaxis  Lung CA Screening    Dina Almaguer MD  [facility]

## 2021-10-27 LAB — HCV AB SERPL QL IA: NONREACTIVE

## 2021-11-01 LAB
BKR LAB AP GYN ADEQUACY: NORMAL
BKR LAB AP GYN INTERPRETATION: NORMAL
BKR LAB AP HPV REFLEX: NORMAL
BKR LAB AP PREVIOUS ABNORMAL: NORMAL
HUMAN PAPILLOMA VIRUS 16 DNA: NEGATIVE
HUMAN PAPILLOMA VIRUS 18 DNA: NEGATIVE
HUMAN PAPILLOMA VIRUS FINAL DIAGNOSIS: NORMAL
HUMAN PAPILLOMA VIRUS OTHER HR: NEGATIVE
PATH REPORT.COMMENTS IMP SPEC: NORMAL
PATH REPORT.RELEVANT HX SPEC: NORMAL

## 2021-11-23 ENCOUNTER — TELEPHONE (OUTPATIENT)
Dept: FAMILY MEDICINE | Facility: CLINIC | Age: 56
End: 2021-11-23

## 2021-11-23 NOTE — TELEPHONE ENCOUNTER
Patient dropped off [employer] form to be filled out. She asked if this could be mailed off to the address on the bottom of the sheet when finished. Please call her if you have any questions. Handing off to Farideh whitlock

## 2022-08-05 ENCOUNTER — TELEPHONE (OUTPATIENT)
Dept: NURSING | Facility: CLINIC | Age: 57
End: 2022-08-05

## 2022-08-05 NOTE — TELEPHONE ENCOUNTER
"Coronavirus (COVID-19) Notification    Caller Name (Patient, parent, daughter/son, grandparent, etc)  Katey    Reason for call  Notify of Positive Coronavirus (COVID-19) lab results, assess symptoms,  review [facility] recommendations    Lab Result    Lab test:  2019-nCoV rRt-PCR or SARS-CoV-2 PCR    Oropharyngeal AND/OR nasopharyngeal swabs is POSITIVE for 2019-nCoV RNA/SARS-COV-2 PCR (COVID-19 virus)    Brief introduction script  Introduce self then review script:  \"I am calling on behalf of [employer].  We were notified that your Coronavirus test (COVID-19) for was POSITIVE for the virus.\"    Gather patient reported symptoms   Assessment   Current Symptoms at time of phone call, reported by patient: (if no symptoms, document No symptoms] Cough, sore throat, runny nose   Date of Symptom(s) onset (if applicable) 8/2     If at time of call, Patients symptoms hare worsened, the Patient should contact 911 or have someone drive them to Emergency Dept promptly:      If Patient calling 911, inform 911 personal that you have tested positive for the Coronavirus (COVID-19).  Place mask on and await 911 to arrive.    If Emergency Dept, If possible, please have another adult drive you to the Emergency Dept but you need to wear mask when in contact with other people.      Monoclonal Antibody Administration    You may be eligible to receive a new treatment with a monoclonal antibody for preventing hospitalization in patients at high risk for complications from COVID-19. This medication is still experimental and available on a limited basis; it is given through an IV and must be given at an infusion center. Please note that not all people who are eligible will receive the medication since it is in limited supply.  Is the patient symptomatic and onset of symptoms within the last 7 days?  Yes  Is the patient interested in a visit with a provider to discuss treatment options?: No  Is the patient seen at [facility] " or [facility]?  No: Warm transfer caller to [phone] to be scheduled with a virtual urgent provider.  During transfer, instruct  on appropriate time frame for visit     Review information with Patient    Your result was positive. This means you have COVID-19 (coronavirus).      How can I protect others?    These guidelines are for isolating before returning to work, school or .       If you DO have symptoms:  o Stay home and away from others  - For at least 5 days after your symptoms started, AND   - You are fever free for 24 hours (with no medicine that reduces fever), AND  - Your other symptoms are better.  o Wear a mask for 10 full days any time you are around others.    If you DON'T have symptoms:  o Stay at home and away from others for at least 5 days after your positive test.  o Wear a mask for 10 full days any time you are around others.    There may be different guidelines for healthcare facilities. Please check with the specific sites before arriving.     If you've been told by a doctor that you were severely ill with COVID-19 or are immunocompromised, you should isolate for at least 10 days.    You should not go back to work until you meet the guidelines above for ending your home isolation. You don't need to be retested for COVID-19 before going back to work--studies show that you won't spread the virus if it's been at least 10 days since your symptoms started (or 20 days, if you have a weak immune system).    Employers, schools, and daycares: This is an official notice for this person's medical guidelines for returning in-person. They must meet the above guidelines before going back to work, school, or  in person.    You will receive a positive COVID-19 letter via [employer] or the mail soon with additional self-care information.      Would you like me to review some of that information with you now?  No    If you were tested for an upcoming procedure, please contact your provider for  next steps.     Blaire Nassar RN

## 2022-08-12 ENCOUNTER — TELEPHONE (OUTPATIENT)
Dept: FAMILY MEDICINE | Facility: CLINIC | Age: 57
End: 2022-08-12

## 2022-08-12 NOTE — TELEPHONE ENCOUNTER
Please advise that at this time antibiotics are not indicated.  This is viral sinusitis from COVID.  If she develops fever or chills or copious discharge that does not subside beyond 10 days, I would suggest virtual visit at that point.  Okay to use OTC decongestant, saline rinses etc.    Dina Almaguer MD

## 2022-08-12 NOTE — TELEPHONE ENCOUNTER
Patient was COVID positive about 8 days ago.  The congestion is causing her not to be able to breathe.  She is wondering if she has a sinus infection.  She is flying next Wednesday and is wondering if she should start an antibiotic.  She has tried Nasacort and nasal washes, that is not helping much.  Please call patient at [phone] with advise.

## 2022-09-11 ENCOUNTER — HEALTH MAINTENANCE LETTER (OUTPATIENT)
Age: 57
End: 2022-09-11

## 2022-10-24 ENCOUNTER — ANCILLARY PROCEDURE (OUTPATIENT)
Dept: CT IMAGING | Facility: CLINIC | Age: 57
End: 2022-10-24
Attending: NURSE PRACTITIONER
Payer: COMMERCIAL

## 2022-10-24 ENCOUNTER — OFFICE VISIT (OUTPATIENT)
Dept: PEDIATRICS | Facility: CLINIC | Age: 57
End: 2022-10-24
Payer: COMMERCIAL

## 2022-10-24 ENCOUNTER — OFFICE VISIT (OUTPATIENT)
Dept: FAMILY MEDICINE | Facility: CLINIC | Age: 57
End: 2022-10-24
Payer: COMMERCIAL

## 2022-10-24 VITALS
TEMPERATURE: 97.3 F | SYSTOLIC BLOOD PRESSURE: 117 MMHG | RESPIRATION RATE: 18 BRPM | HEART RATE: 67 BPM | DIASTOLIC BLOOD PRESSURE: 82 MMHG | OXYGEN SATURATION: 99 %

## 2022-10-24 VITALS
SYSTOLIC BLOOD PRESSURE: 123 MMHG | HEIGHT: 65 IN | BODY MASS INDEX: 27.31 KG/M2 | OXYGEN SATURATION: 100 % | WEIGHT: 163.9 LBS | DIASTOLIC BLOOD PRESSURE: 78 MMHG | HEART RATE: 67 BPM | TEMPERATURE: 97.7 F

## 2022-10-24 DIAGNOSIS — R07.81 RIB PAIN ON RIGHT SIDE: ICD-10-CM

## 2022-10-24 DIAGNOSIS — F10.90 HEAVY ALCOHOL USE: ICD-10-CM

## 2022-10-24 DIAGNOSIS — R10.9 LEFT FLANK PAIN: Primary | ICD-10-CM

## 2022-10-24 DIAGNOSIS — F41.9 ANXIETY: ICD-10-CM

## 2022-10-24 DIAGNOSIS — R42 LIGHT HEADED: ICD-10-CM

## 2022-10-24 DIAGNOSIS — R10.12 LUQ ABDOMINAL PAIN: ICD-10-CM

## 2022-10-24 DIAGNOSIS — R10.11 RUQ ABDOMINAL PAIN: ICD-10-CM

## 2022-10-24 DIAGNOSIS — R10.9 ACUTE LEFT FLANK PAIN: Primary | ICD-10-CM

## 2022-10-24 LAB
ALBUMIN SERPL-MCNC: 3.7 G/DL (ref 3.4–5)
ALP SERPL-CCNC: 62 U/L (ref 40–150)
ALT SERPL W P-5'-P-CCNC: 19 U/L (ref 0–50)
AMYLASE SERPL-CCNC: 86 U/L (ref 30–110)
ANION GAP SERPL CALCULATED.3IONS-SCNC: 1 MMOL/L (ref 3–14)
AST SERPL W P-5'-P-CCNC: 16 U/L (ref 0–45)
BASOPHILS # BLD AUTO: 0.1 10E3/UL (ref 0–0.2)
BASOPHILS NFR BLD AUTO: 1 %
BILIRUB SERPL-MCNC: 0.4 MG/DL (ref 0.2–1.3)
BUN SERPL-MCNC: 18 MG/DL (ref 7–30)
CALCIUM SERPL-MCNC: 9.7 MG/DL (ref 8.5–10.1)
CHLORIDE BLD-SCNC: 109 MMOL/L (ref 94–109)
CO2 SERPL-SCNC: 31 MMOL/L (ref 20–32)
CREAT SERPL-MCNC: 0.89 MG/DL (ref 0.52–1.04)
EOSINOPHIL # BLD AUTO: 0.6 10E3/UL (ref 0–0.7)
EOSINOPHIL NFR BLD AUTO: 11 %
ERYTHROCYTE [DISTWIDTH] IN BLOOD BY AUTOMATED COUNT: 13.2 % (ref 10–15)
GFR SERPL CREATININE-BSD FRML MDRD: 77 ML/MIN/1.73M2
GLUCOSE BLD-MCNC: 90 MG/DL (ref 70–99)
HCT VFR BLD AUTO: 40.1 % (ref 35–47)
HGB BLD-MCNC: 13.3 G/DL (ref 11.7–15.7)
IMM GRANULOCYTES # BLD: 0 10E3/UL
IMM GRANULOCYTES NFR BLD: 0 %
LIPASE SERPL-CCNC: 120 U/L (ref 73–393)
LYMPHOCYTES # BLD AUTO: 1.8 10E3/UL (ref 0.8–5.3)
LYMPHOCYTES NFR BLD AUTO: 31 %
MCH RBC QN AUTO: 31.4 PG (ref 26.5–33)
MCHC RBC AUTO-ENTMCNC: 33.2 G/DL (ref 31.5–36.5)
MCV RBC AUTO: 95 FL (ref 78–100)
MONOCYTES # BLD AUTO: 0.5 10E3/UL (ref 0–1.3)
MONOCYTES NFR BLD AUTO: 8 %
NEUTROPHILS # BLD AUTO: 2.8 10E3/UL (ref 1.6–8.3)
NEUTROPHILS NFR BLD AUTO: 49 %
NRBC # BLD AUTO: 0 10E3/UL
NRBC BLD AUTO-RTO: 0 /100
PLATELET # BLD AUTO: 207 10E3/UL (ref 150–450)
POTASSIUM BLD-SCNC: 4.1 MMOL/L (ref 3.4–5.3)
PROT SERPL-MCNC: 7.1 G/DL (ref 6.8–8.8)
RADIOLOGIST FLAGS: NORMAL
RBC # BLD AUTO: 4.24 10E6/UL (ref 3.8–5.2)
SODIUM SERPL-SCNC: 141 MMOL/L (ref 133–144)
WBC # BLD AUTO: 5.8 10E3/UL (ref 4–11)

## 2022-10-24 PROCEDURE — 99207 REFERRAL TO ACUTE AND DIAGNOSTIC SERVICES: CPT | Performed by: FAMILY MEDICINE

## 2022-10-24 PROCEDURE — 74176 CT ABD & PELVIS W/O CONTRAST: CPT | Mod: GC | Performed by: STUDENT IN AN ORGANIZED HEALTH CARE EDUCATION/TRAINING PROGRAM

## 2022-10-24 PROCEDURE — 36415 COLL VENOUS BLD VENIPUNCTURE: CPT | Performed by: NURSE PRACTITIONER

## 2022-10-24 PROCEDURE — 99214 OFFICE O/P EST MOD 30 MIN: CPT | Performed by: NURSE PRACTITIONER

## 2022-10-24 PROCEDURE — 82150 ASSAY OF AMYLASE: CPT | Performed by: NURSE PRACTITIONER

## 2022-10-24 PROCEDURE — 80053 COMPREHEN METABOLIC PANEL: CPT | Performed by: NURSE PRACTITIONER

## 2022-10-24 PROCEDURE — 85025 COMPLETE CBC W/AUTO DIFF WBC: CPT | Performed by: NURSE PRACTITIONER

## 2022-10-24 PROCEDURE — 83690 ASSAY OF LIPASE: CPT | Performed by: NURSE PRACTITIONER

## 2022-10-24 ASSESSMENT — PAIN SCALES - GENERAL
PAINLEVEL: SEVERE PAIN (6)
PAINLEVEL: SEVERE PAIN (6)

## 2022-10-24 NOTE — PATIENT INSTRUCTIONS
Results for orders placed or performed in visit on 10/24/22   CT Abdomen Pelvis w/o Contrast     Status: None (Preliminary result)    Impression    RESIDENT PRELIMINARY INTERPRETATION  IMPRESSION:   1. No acute findings within the abdomen or pelvis.  2. Question old right anterolateral fifth rib fracture deformity,  incompletely imaged on this study.    Lipase     Status: Normal   Result Value Ref [facility]    Lipase 120 73 - 393 U/L   Comprehensive metabolic panel     Status: Abnormal   Result Value Ref [facility]    Sodium 141 133 - 144 mmol/L    Potassium 4.1 3.4 - 5.3 mmol/L    Chloride 109 94 - 109 mmol/L    Carbon Dioxide (CO2) 31 20 - 32 mmol/L    Anion Gap 1 (L) 3 - 14 mmol/L    Urea Nitrogen 18 7 - 30 mg/dL    Creatinine 0.89 0.52 - 1.04 mg/dL    Calcium 9.7 8.5 - 10.1 mg/dL    Glucose 90 70 - 99 mg/dL    Alkaline Phosphatase 62 40 - 150 U/L    AST 16 0 - 45 U/L    ALT 19 0 - 50 U/L    Protein Total 7.1 6.8 - 8.8 g/dL    Albumin 3.7 3.4 - 5.0 g/dL    Bilirubin Total 0.4 0.2 - 1.3 mg/dL    GFR Estimate 77 >60 mL/min/1.73m2   Amylase     Status: Normal   Result Value Ref [facility]    Amylase 86 30 - 110 U/L   CBC with platelets and differential     Status: None   Result Value Ref [facility]    WBC Count 5.8 4.0 - 11.0 10e3/uL    RBC Count 4.24 3.80 - 5.20 10e6/uL    Hemoglobin 13.3 11.7 - 15.7 g/dL    Hematocrit 40.1 35.0 - 47.0 %    MCV 95 78 - 100 fL    MCH 31.4 26.5 - 33.0 pg    MCHC 33.2 31.5 - 36.5 g/dL    RDW 13.2 10.0 - 15.0 %    Platelet Count 207 150 - 450 10e3/uL    % Neutrophils 49 %    % Lymphocytes 31 %    % Monocytes 8 %    % Eosinophils 11 %    % Basophils 1 %    % Immature Granulocytes 0 %    NRBCs per 100 WBC 0 <1 /100    Absolute Neutrophils 2.8 1.6 - 8.3 10e3/uL    Absolute Lymphocytes 1.8 0.8 - 5.3 10e3/uL    Absolute Monocytes 0.5 0.0 - 1.3 10e3/uL    Absolute Eosinophils 0.6 0.0 - 0.7 10e3/uL    Absolute Basophils 0.1 0.0 - 0.2 10e3/uL    Absolute Immature Granulocytes 0.0 <=0.4 10e3/uL    Absolute NRBCs  0.0 10e3/uL   CBC with platelets differential     Status: None    Narrative    The following orders were created for panel order CBC with platelets differential.  Procedure                               Abnormality         Status                     ---------                               -----------         ------                     CBC with platelets and d...[[id number]]                      Final result                 Please view results for these tests on the individual orders.     No acute finding on the CT to explain the left flank pain.    Possible OLD rib fracture noted.    Recommend RICE as able  OK to take ibuprofen/acetaminophen PRN pain.    I recommend backing off yoga for some time to allow the ribs to heal.

## 2022-10-24 NOTE — PROGRESS NOTES
Assessment & Plan     Acute left flank pain  - CT Abdomen Pelvis w/o Contrast; Future  - CT Abdomen Pelvis w/o Contrast    LUQ abdominal pain  - CBC with platelets differential; Future  - Comprehensive metabolic panel; Future  - CT Abdomen Pelvis w/o Contrast; Future  - CT Abdomen Pelvis w/o Contrast  - Comprehensive metabolic panel  - CBC with platelets differential    RUQ abdominal pain  - Amylase; Future  - CBC with platelets differential; Future  - Comprehensive metabolic panel; Future  - Lipase; Future  - CT Abdomen Pelvis w/o Contrast; Future  - CT Abdomen Pelvis w/o Contrast  - Lipase  - Comprehensive metabolic panel  - CBC with platelets differential  - Amylase    Rib pain on right side          Patient Instructions     Results for orders placed or performed in visit on 10/24/22   CT Abdomen Pelvis w/o Contrast     Status: None (Preliminary result)    Impression    RESIDENT PRELIMINARY INTERPRETATION  IMPRESSION:   1. No acute findings within the abdomen or pelvis.  2. Question old right anterolateral fifth rib fracture deformity,  incompletely imaged on this study.    Lipase     Status: Normal   Result Value Ref [facility]    Lipase 120 73 - 393 U/L   Comprehensive metabolic panel     Status: Abnormal   Result Value Ref [facility]    Sodium 141 133 - 144 mmol/L    Potassium 4.1 3.4 - 5.3 mmol/L    Chloride 109 94 - 109 mmol/L    Carbon Dioxide (CO2) 31 20 - 32 mmol/L    Anion Gap 1 (L) 3 - 14 mmol/L    Urea Nitrogen 18 7 - 30 mg/dL    Creatinine 0.89 0.52 - 1.04 mg/dL    Calcium 9.7 8.5 - 10.1 mg/dL    Glucose 90 70 - 99 mg/dL    Alkaline Phosphatase 62 40 - 150 U/L    AST 16 0 - 45 U/L    ALT 19 0 - 50 U/L    Protein Total 7.1 6.8 - 8.8 g/dL    Albumin 3.7 3.4 - 5.0 g/dL    Bilirubin Total 0.4 0.2 - 1.3 mg/dL    GFR Estimate 77 >60 mL/min/1.73m2   Amylase     Status: Normal   Result Value Ref [facility]    Amylase 86 30 - 110 U/L   CBC with platelets and differential     Status: None   Result Value Ref [facility]    WBC  "Count 5.8 4.0 - 11.0 10e3/uL    RBC Count 4.24 3.80 - 5.20 10e6/uL    Hemoglobin 13.3 11.7 - 15.7 g/dL    Hematocrit 40.1 35.0 - 47.0 %    MCV 95 78 - 100 fL    MCH 31.4 26.5 - 33.0 pg    MCHC 33.2 31.5 - 36.5 g/dL    RDW 13.2 10.0 - 15.0 %    Platelet Count 207 150 - 450 10e3/uL    % Neutrophils 49 %    % Lymphocytes 31 %    % Monocytes 8 %    % Eosinophils 11 %    % Basophils 1 %    % Immature Granulocytes 0 %    NRBCs per 100 WBC 0 <1 /100    Absolute Neutrophils 2.8 1.6 - 8.3 10e3/uL    Absolute Lymphocytes 1.8 0.8 - 5.3 10e3/uL    Absolute Monocytes 0.5 0.0 - 1.3 10e3/uL    Absolute Eosinophils 0.6 0.0 - 0.7 10e3/uL    Absolute Basophils 0.1 0.0 - 0.2 10e3/uL    Absolute Immature Granulocytes 0.0 <=0.4 10e3/uL    Absolute NRBCs 0.0 10e3/uL   CBC with platelets differential     Status: None    Narrative    The following orders were created for panel order CBC with platelets differential.  Procedure                               Abnormality         Status                     ---------                               -----------         ------                     CBC with platelets and d...[[id number]]                      Final result                 Please view results for these tests on the individual orders.     No acute finding on the CT to explain the left flank pain.    Possible OLD rib fracture noted.    Recommend RICE as able  OK to take ibuprofen/acetaminophen PRN pain.    I recommend backing off yoga for some time to allow the ribs to heal.          Return in about 1 week (around 10/31/2022) for with regular provider if symptoms persist.    [facility]    Stuart Del Toro is a 56 year old presenting  urgently to [facility] by referral from Yeni Tomlin MD for the following health issues:  Abdominal Pain      HPI Patient states that she injured her right side rib 6 months ago. She explains that she will bend and feel sharp pain \"I can feel " clicking and grinding in the right ribs.   Thinks she is reinjuring the ribs as she keeps trying yto go back to Yoga.        She also C/O L flank and LUQ pain that started last night. Most pain with movement or palpation.   Denies UTI SX   Denies N/V/D, stools have been normal/regular  Denies surgeries  Denies HX of kidney stone and hematuria.   No trauma to the left side.   Rating pain +6/10 to left flank/LUQ    Review of Systems   Constitutional, HEENT, cardiovascular, pulmonary, GI, , musculoskeletal, neuro, skin, endocrine and psych systems are negative, except as otherwise noted.      Objective    /82 (BP Location: Right arm, Patient Position: Sitting, Cuff Size: Adult Regular)   Pulse 67   Temp 97.3  F (36.3  C) (Oral)   Resp 18   SpO2 99%   There is no height or weight on file to calculate BMI.  Physical Exam   GENERAL: healthy, alert and no distress  RESP: lungs clear to auscultation - no rales, rhonchi or wheezes  CV: regular rate and rhythm, normal S1 S2, no S3 or S4, no murmur, click or rub, no peripheral edema and peripheral pulses strong  ABDOMEN: tenderness LUQ, bowel sounds normal, no palpable or pulsatile masses and umbilicus normal  MS: left CVA tenderness no gross musculoskeletal defects noted, no edema  SKIN: no suspicious lesions or rashes  PSYCH: mentation appears normal, affect normal/bright

## 2022-10-24 NOTE — PROGRESS NOTES
"  Assessment & Plan     Left flank pain  Rib pain on right side    Anxiety  Heavy alcohol use  Understands etohg is an issue and wants to quit drinking. Open to referral to mental health. Wants to hold off on starting meds until after her trips and will f/u with her pcp.   - Adult Mental Health  Referral; Future    Light headed  Push water.      Would benefit from advanced imaging given the intensity of pain and guarding on exam along with same day labs  Discussed with patient and she agrees. [facility] has accepted her        Referral to Acute and Diagnostic Services    The [facility] has been contacted at [phone] ([place]) to confirm patient acceptance. The transition to Acute & Diagnostic Services Clinic has been discussed with patient, and she agrees with next level of care.  Patient understands that evaluation/treatment at [facility] typically takes significantly longer than in clinic/urgent care (>2 hours).          Special issues:  None          Referral placed: Yes  Patient has transportation arranged and will travel to the [facility] without delay: Yes  Patient aware not to eat or drink. Yes    The following provider has assessed this patient for intervention at [facility], and directed the patient for referral: MD Yeni Carr MD         39 minutes spent on the date of the encounter doing chart review, interpretation of tests, documentation and discussion with other provider(s)        BMI:   Estimated body mass index is 27.31 kg/m  as calculated from the following:    Height as of this encounter: 1.65 m (5' 4.96\").    Weight as of this encounter: 74.3 kg (163 lb 14.4 oz).       Return today (on 10/24/2022) for acute diagnotic center and [facility] primary MD to address your mental health. .    Yeni Tomlin MD  [facility] BUDDY Del Toro is a 56 year old, presenting for the following health " issues:  Rib Injury (Left side more painful )      History of Present Illness       Reason for visit:  Rib injury and pain on opposite side  Symptom onset:  1-3 days ago  Symptoms include:  Rib injured on right side, pain to touch and when coughing on left side back  Symptom intensity:  Moderate  Symptom progression:  Staying the same  Had these symptoms before:  No  What makes it better:  Ibuprofen    She eats 2-3 servings of fruits and vegetables daily.She consumes 0 sweetened beverage(s) daily.She exercises with enough effort to increase her heart rate 10 to 19 minutes per day.  She exercises with enough effort to increase her heart rate 5 days per week.   She is taking medications regularly.     Two separate things are going on.     6-8 months ago, tripped in her office and fell down onto her right lower ribs and right arm. Was very painful and took a long time to heal.   That pain flared again when doing yoga.   4-5 days ago when bending down to grab something pain flared. Now it hurts every time she moves.   Pain is on ribs under breasts. Feels it clicking around.   Breathing is fine.     Last night while watching tv started to get left flank pain. Worse with coughing.   No injury or new activity.   Pain is primarily with movement or if touch  Ibuprofen helped and currently little less painful than when first woke up     No dysurea, frequency.   No blood in urine.   Feel slightly more bloated today  bm was nl today but didn't really impact the pain.   Hurts more with deep breath or cough   However denies feeling winded or short of breath.     Hx of asthma and allergies. occ will get minor cough with these.     1 mo ago had glass of spiked cider. Got profuse sweaty. Hands spasmd.   Called ambulance. Initially bp was 80's. ekg was okay.   Went to ER  Does get frequent light-headed episodes. These are fairly. Not sure of trigger.   Reports poor water intake. Typically will only drink 30 oz of water but usually  "only in the afternoon  No recent palpitations. However, notes, consistent skipped beat occ when feel her pulse. However, has had fluttering heart feeling at times in past (has been a while now).   Mom with arrhythmia.     Has been using more etoh than normal. Up to bottle of wine per day. Drinking like this a few months. Prior to that few drinks per day.   Know that there is a etoh problem.   Ongoing depression/anxiety that has never been addressed.   no DWI.   No prior hx of withdrawel.   Stopped etoh for 5 months one year ago.   REady for some intervention.  Wonders if add.  Also has panic/anxiety and feeling more depressed.   No suicidal ideation  Not isolating  Feel very stressed all the time.   Sleeping ok but only with the wine.   occ use cbd  Felt physically better when not drinking (better energy/weight) but was still stressed out.   Son is on anti-depressants  Has two trips coming up so not wanting to start anything yet.          Review of Systems   Constitutional, HEENT, cardiovascular, pulmonary, gi and gu systems are negative, except as otherwise noted.      Objective    /78 (BP Location: Right arm, Patient Position: Sitting, Cuff Size: Adult Regular)   Pulse 67   Temp 97.7  F (36.5  C) (Oral)   Ht 1.65 m (5' 4.96\")   Wt 74.3 kg (163 lb 14.4 oz)   SpO2 100%   BMI 27.31 kg/m    Body mass index is 27.31 kg/m .  Physical Exam   GENERAL: healthy, alert and no distress  NECK: no adenopathy, no asymmetry, masses, or scars and thyroid normal to palpation  RESP: lungs clear to auscultation - no rales, rhonchi or wheezes  CV: regular rate and rhythm, normal S1 S2, no S3 or S4, no murmur, click or rub, no peripheral edema and peripheral pulses strong. Right tenderness just inferior to right breast in mid-clavicular line  ABDOMEN: pain LUQ with guarding, no hepatosplenomegaly, no masses and bowel sounds normal. ++ left flank pain and lower rib pain on left in mid-axillary line  MS: no gross " musculoskeletal defects noted, no edema  Psych: tearful when discussing her etoh use/mental health

## 2023-01-23 ENCOUNTER — HEALTH MAINTENANCE LETTER (OUTPATIENT)
Age: 58
End: 2023-01-23

## 2023-03-14 ENCOUNTER — ANCILLARY PROCEDURE (OUTPATIENT)
Dept: MAMMOGRAPHY | Facility: CLINIC | Age: 58
End: 2023-03-14
Attending: FAMILY MEDICINE
Payer: COMMERCIAL

## 2023-03-14 DIAGNOSIS — Z12.31 VISIT FOR SCREENING MAMMOGRAM: ICD-10-CM

## 2023-03-14 PROCEDURE — 77067 SCR MAMMO BI INCL CAD: CPT | Mod: TC | Performed by: RADIOLOGY

## 2023-05-11 ASSESSMENT — ENCOUNTER SYMPTOMS
CHILLS: 0
WEAKNESS: 0
FREQUENCY: 0
HEARTBURN: 0
HEMATOCHEZIA: 0
NERVOUS/ANXIOUS: 1
EYE PAIN: 0
JOINT SWELLING: 0
DIZZINESS: 1
COUGH: 0
PALPITATIONS: 0
HEMATURIA: 0
BREAST MASS: 0
DYSURIA: 0
ARTHRALGIAS: 0
SHORTNESS OF BREATH: 0
CONSTIPATION: 0
SORE THROAT: 0
MYALGIAS: 0
FEVER: 0
NAUSEA: 0
ABDOMINAL PAIN: 0
DIARRHEA: 0
HEADACHES: 0
PARESTHESIAS: 0

## 2023-05-12 ENCOUNTER — OFFICE VISIT (OUTPATIENT)
Dept: FAMILY MEDICINE | Facility: CLINIC | Age: 58
End: 2023-05-12
Payer: COMMERCIAL

## 2023-05-12 VITALS
SYSTOLIC BLOOD PRESSURE: 92 MMHG | HEIGHT: 65 IN | TEMPERATURE: 98.1 F | DIASTOLIC BLOOD PRESSURE: 64 MMHG | RESPIRATION RATE: 10 BRPM | OXYGEN SATURATION: 97 % | HEART RATE: 70 BPM | WEIGHT: 160.4 LBS | BODY MASS INDEX: 26.73 KG/M2

## 2023-05-12 DIAGNOSIS — Z12.11 SCREEN FOR COLON CANCER: ICD-10-CM

## 2023-05-12 DIAGNOSIS — R91.1 PULMONARY NODULE: ICD-10-CM

## 2023-05-12 DIAGNOSIS — M25.512 LEFT SHOULDER PAIN, UNSPECIFIED CHRONICITY: ICD-10-CM

## 2023-05-12 DIAGNOSIS — Z78.9 PATIENT TRAVELS: ICD-10-CM

## 2023-05-12 DIAGNOSIS — Z00.00 ROUTINE GENERAL MEDICAL EXAMINATION AT A HEALTH CARE FACILITY: Primary | ICD-10-CM

## 2023-05-12 LAB
ALBUMIN SERPL BCG-MCNC: 4.5 G/DL (ref 3.5–5.2)
ALP SERPL-CCNC: 64 U/L (ref 35–104)
ALT SERPL W P-5'-P-CCNC: 16 U/L (ref 10–35)
ANION GAP SERPL CALCULATED.3IONS-SCNC: 12 MMOL/L (ref 7–15)
AST SERPL W P-5'-P-CCNC: 24 U/L (ref 10–35)
BILIRUB SERPL-MCNC: 0.5 MG/DL
BUN SERPL-MCNC: 14.5 MG/DL (ref 6–20)
CALCIUM SERPL-MCNC: 9.7 MG/DL (ref 8.6–10)
CHLORIDE SERPL-SCNC: 103 MMOL/L (ref 98–107)
CHOLEST SERPL-MCNC: 245 MG/DL
CREAT SERPL-MCNC: 0.74 MG/DL (ref 0.51–0.95)
DEPRECATED HCO3 PLAS-SCNC: 26 MMOL/L (ref 22–29)
ERYTHROCYTE [DISTWIDTH] IN BLOOD BY AUTOMATED COUNT: 13 % (ref 10–15)
GFR SERPL CREATININE-BSD FRML MDRD: >90 ML/MIN/1.73M2
GLUCOSE SERPL-MCNC: 84 MG/DL (ref 70–99)
HCT VFR BLD AUTO: 41.1 % (ref 35–47)
HDLC SERPL-MCNC: 75 MG/DL
HGB BLD-MCNC: 13.6 G/DL (ref 11.7–15.7)
LDLC SERPL CALC-MCNC: 157 MG/DL
MCH RBC QN AUTO: 31.2 PG (ref 26.5–33)
MCHC RBC AUTO-ENTMCNC: 33.1 G/DL (ref 31.5–36.5)
MCV RBC AUTO: 94 FL (ref 78–100)
NONHDLC SERPL-MCNC: 170 MG/DL
PLATELET # BLD AUTO: 201 10E3/UL (ref 150–450)
POTASSIUM SERPL-SCNC: 4.1 MMOL/L (ref 3.4–5.3)
PROT SERPL-MCNC: 7.3 G/DL (ref 6.4–8.3)
RBC # BLD AUTO: 4.36 10E6/UL (ref 3.8–5.2)
SODIUM SERPL-SCNC: 141 MMOL/L (ref 136–145)
TRIGL SERPL-MCNC: 67 MG/DL
WBC # BLD AUTO: 6.7 10E3/UL (ref 4–11)

## 2023-05-12 PROCEDURE — 85027 COMPLETE CBC AUTOMATED: CPT | Performed by: FAMILY MEDICINE

## 2023-05-12 PROCEDURE — 99396 PREV VISIT EST AGE 40-64: CPT | Performed by: FAMILY MEDICINE

## 2023-05-12 PROCEDURE — 99213 OFFICE O/P EST LOW 20 MIN: CPT | Mod: 25 | Performed by: FAMILY MEDICINE

## 2023-05-12 PROCEDURE — 36415 COLL VENOUS BLD VENIPUNCTURE: CPT | Performed by: FAMILY MEDICINE

## 2023-05-12 PROCEDURE — 80053 COMPREHEN METABOLIC PANEL: CPT | Performed by: FAMILY MEDICINE

## 2023-05-12 PROCEDURE — 80061 LIPID PANEL: CPT | Performed by: FAMILY MEDICINE

## 2023-05-12 RX ORDER — FEXOFENADINE HCL 180 MG/1
180 TABLET ORAL DAILY
COMMUNITY

## 2023-05-12 ASSESSMENT — ENCOUNTER SYMPTOMS
CHILLS: 0
FREQUENCY: 0
PALPITATIONS: 0
JOINT SWELLING: 0
ABDOMINAL PAIN: 0
NAUSEA: 0
DYSURIA: 0
HEARTBURN: 0
DIARRHEA: 0
NERVOUS/ANXIOUS: 1
SHORTNESS OF BREATH: 0
HEMATURIA: 0
BREAST MASS: 0
EYE PAIN: 0
FEVER: 0
PARESTHESIAS: 0
SORE THROAT: 0
MYALGIAS: 0
HEMATOCHEZIA: 0
COUGH: 0
DIZZINESS: 1
HEADACHES: 0
WEAKNESS: 0
CONSTIPATION: 0
ARTHRALGIAS: 0

## 2023-05-12 NOTE — PROGRESS NOTES
"  ASSESSMENT/PLAN:       ICD-10-CM    1. Routine general medical examination at a health care facility  Z00.00 Lipid Profile (Chol, Trig, HDL, LDL calc)     Comprehensive metabolic panel (BMP + Alb, Alk Phos, ALT, AST, Total. Bili, TP)     CBC with platelets     Lipid Profile (Chol, Trig, HDL, LDL calc)     Comprehensive metabolic panel (BMP + Alb, Alk Phos, ALT, AST, Total. Bili, TP)     CBC with platelets      2. Screen for colon cancer  Z12.11 Colonoscopy Screening  Referral      3. Pulmonary nodule  R91.1 CT Chest w/o Contrast      4. Left shoulder pain, unspecified chronicity  M25.512       5. Patient travels  Z78.9         Patient is in today for routine physical exam  1: Pulmonary nodule: Repeat CT scan in 6 to 12 months was recommended.  This is ordered.  2: Left shoulder and clavicle pain.  Possible muscle sprain or strain.  Symptom care is advised.  Exam does not show any tenderness and range of motion is adequate.  A CT scan would cover this area to.  3: History of adenoma: Discussed that this was serrated with 1 being 11 mm and recommend screening colonoscopy which was missed last year.  Patient will schedule.  4: Patient travels: This is a trip to [place] and mostly in cities.  Discussed areas of malaria.  Patient will check if she is traveling into that area and will get back to me in case she needs prophylaxis.  We discussed her alcohol use and scores.  She informs me that she is cut down and is continuing to decrease her intake.  She defers any immunization as she is planning to travel tomorrow.  COUNSELING:  Reviewed preventive health counseling, as reflected in patient instructions       Regular exercise       Healthy diet/nutrition       Vision screening       Colorectal Cancer Screening      BMI:   Estimated body mass index is 26.69 kg/m  as calculated from the following:    Height as of this encounter: 1.651 m (5' 5\").    Weight as of this encounter: 72.8 kg (160 lb 6.4 oz).         She " reports that she has never smoked. She has never been exposed to tobacco smoke. She has never used smokeless tobacco.       SUBJECTIVE:   CC: Katey is an 57 year old who presents for preventive health visit.   Chief Complaint   Patient presents with     Physical     Shoulder Pain     Left should pain that has migrated along the collar bone and is very painful to the touch.  Was just on a flight a couple of days ago.      Follow Up     Follow up on a lump that was found on a scan.       travel consult     Traveling in a month to [facility].  One night in a hut, also on a boat.  The rest of the time will be in a city setting.             5/12/2023     3:06 PM   Additional Questions   Roomed by JOE Gonsales CMA([facility])   Patient has been advised of split billing requirements and indicates understanding: Yes  Healthy Habits:     Getting at least 3 servings of Calcium per day:  Yes    Bi-annual eye exam:  NO    Dental care twice a year:  Yes    Sleep apnea or symptoms of sleep apnea:  Excessive snoring and Sleep apnea    Diet:  Regular (no restrictions)    Frequency of exercise:  4-5 days/week    Duration of exercise:  15-30 minutes    Taking medications regularly:  Yes    Medication side effects:  Not applicable    PHQ-2 Total Score: 0    Additional concerns today:  Yes  Shoulder Pain  Associated symptoms include chest pain and congestion. Pertinent negatives include no abdominal pain, arthralgias, chills, coughing, fever, headaches, joint swelling, myalgias, nausea, rash, sore throat or weakness.       Today's PHQ-2 Score:       5/11/2023    10:22 PM   PHQ-2 ( 1999 [employer])   Q1: Little interest or pleasure in doing things 0   Q2: Feeling down, depressed or hopeless 0   PHQ-2 Score 0   Q1: Little interest or pleasure in doing things Several days    Not at all   Q2: Feeling down, depressed or hopeless Several days    Not at all   PHQ-2 Score 2    0           Social History     Tobacco Use     Smoking status: Never      Passive exposure: Never     Smokeless tobacco: Never   Vaping Use     Vaping status: Never Used   Substance Use Topics     Alcohol use: Yes             5/11/2023    10:22 PM   Alcohol Use   Prescreen: >3 drinks/day or >7 drinks/week? Yes   AUDIT SCORE  11         5/11/2023    10:22 PM   AUDIT - Alcohol Use Disorders Identification Test - Reproduced from the World Health Organization Audit 2001 (Second Edition)   1.  How often do you have a drink containing alcohol? 4 or more times a week   2.  How many drinks containing alcohol do you have on a typical day when you are drinking? 1 or 2   3.  How often do you have five or more drinks on one occasion? Less than monthly   4.  How often during the last year have you found that you were not able to stop drinking once you had started? Less than monthly   5.  How often during the last year have you failed to do what was normally expected of you because of drinking? Less than monthly   6.  How often during the last year have you needed a first drink in the morning to get yourself going after a heavy drinking session? Never   7.  How often during the last year have you had a feeling of guilt or remorse after drinking? Less than monthly   8.  How often during the last year have you been unable to remember what happened the night before because of your drinking? Less than monthly   9.  Have you or someone else been injured because of your drinking? Yes, but not in the last year   10. Has a relative, friend, doctor or other health care worker been concerned about your drinking or suggested you cut down? No   TOTAL SCORE 11     Reviewed orders with patient.  Reviewed health maintenance and updated orders accordingly - Yes  BP Readings from Last 3 Encounters:   05/12/23 92/64   10/24/22 117/82   10/24/22 123/78    Wt Readings from Last 3 Encounters:   05/12/23 72.8 kg (160 lb 6.4 oz)   10/24/22 74.3 kg (163 lb 14.4 oz)   10/26/21 71.1 kg (156 lb 12.8 oz)                  Patient  Active Problem List   Diagnosis     Allergic rhinitis     Overweight     Past Surgical History:   Procedure Laterality Date     ANTROSTOMY NASAL Bilateral 2017    Procedure: ANTROSTOMY NASAL;;  Surgeon: Dorothy Garcia MD;  Location: [facility] OR     ENDOSCOPIC POLYPECTOMY NASAL Bilateral 2017    Procedure: ENDOSCOPIC POLYPECTOMY NASAL;  Bilateral Polypectomy, Bilateral Maxillary Antrostomy, Ethmoidectomy;  Surgeon: Dorothy Garcia MD;  Location: [facility] OR     NASAL POLYP SURGERY       NASAL/SINUS POLYPECTOMY       NO HISTORY OF SURGERY         Social History     Tobacco Use     Smoking status: Never     Passive exposure: Never     Smokeless tobacco: Never   Vaping Use     Vaping status: Never Used   Substance Use Topics     Alcohol use: Yes     Family History   Problem Relation Age of Onset     Heart Disease Mother      Atrial fibrillation Mother      Heart Failure Mother      Coronary Artery Disease Mother         Afib     No Known Problems Father      Lung Cancer Maternal Grandmother         smoker     Heart Disease Maternal Grandfather      Breast Cancer No family hx of      Ovarian Cancer No family hx of          Current Outpatient Medications   Medication Sig Dispense Refill     fexofenadine (ALLEGRA ALLERGY) 180 MG tablet Take 180 mg by mouth daily       triamcinolone (NASACORT) 55 MCG/ACT Inhaler Spray 2 sprays into both nostrils daily         Breast Cancer Screenin/11/2023    10:23 PM   Breast CA Risk Assessment (FHS-7)   Do you have a family history of breast, colon, or ovarian cancer? No / Unknown         Mammogram Screening: Recommended annual mammography  Pertinent mammograms are reviewed under the imaging tab.    History of abnormal Pap smear: NO - age 30-65 PAP every 5 years with negative HPV co-testing recommended      Latest Ref Rng & Units 10/26/2021     1:16 PM 10/25/2016     5:14 PM   PAP / HPV   PAP  Negative for Intraepithelial Lesion or Malignancy (NILM)   Negative for squamous  intraepithelial lesion or malignancy  Electronically signed by Tasha Eduardo CT (ASCP) on 11/1/2016 at 11:15 AM       HPV 16 DNA Negative Negative      HPV 18 DNA Negative Negative      Other HR HPV Negative Negative        Reviewed and updated as needed this visit by clinical staff   Tobacco  Allergies  Meds  Problems  Med Hx  Surg Hx  Fam Hx          Reviewed and updated as needed this visit by Provider   Tobacco  Allergies  Meds  Problems  Med Hx  Surg Hx  Fam Hx         Past Medical History:   Diagnosis Date     Allergic rhinitis      Anxiety      Chronic sinusitis      Hearing problem      Nasal polyps      Sinus problem      Uncomplicated asthma       Past Surgical History:   Procedure Laterality Date     ANTROSTOMY NASAL Bilateral 8/11/2017    Procedure: ANTROSTOMY NASAL;;  Surgeon: Dorothy Garcia MD;  Location: [facility] OR     ENDOSCOPIC POLYPECTOMY NASAL Bilateral 8/11/2017    Procedure: ENDOSCOPIC POLYPECTOMY NASAL;  Bilateral Polypectomy, Bilateral Maxillary Antrostomy, Ethmoidectomy;  Surgeon: Dorothy Garcia MD;  Location:  OR     NASAL POLYP SURGERY       NASAL/SINUS POLYPECTOMY       NO HISTORY OF SURGERY         Review of Systems   Constitutional: Negative for chills and fever.   HENT: Positive for congestion. Negative for ear pain, hearing loss and sore throat.    Eyes: Negative for pain and visual disturbance.   Respiratory: Negative for cough and shortness of breath.    Cardiovascular: Positive for chest pain and peripheral edema. Negative for palpitations.   Gastrointestinal: Negative for abdominal pain, constipation, diarrhea, heartburn, hematochezia and nausea.   Breasts:  Negative for tenderness, breast mass and discharge.   Genitourinary: Negative for dysuria, frequency, genital sores, hematuria, pelvic pain, urgency, vaginal bleeding and vaginal discharge.   Musculoskeletal: Negative for arthralgias, joint swelling and myalgias.   Skin: Negative for rash.   Neurological:  "Positive for dizziness. Negative for weakness, headaches and paresthesias.   Psychiatric/Behavioral: Negative for mood changes. The patient is nervous/anxious.      For congestion, following with ENT.  Chest pain is more of the left shoulder pain in the clavicular area.  Peripheral edema: Trace edema with sock marking.  Dizziness when not well-hydrated.  Nervous/anxious-no new changes and does not like taking medication.  OBJECTIVE:   BP 92/64   Pulse 70   Temp 98.1  F (36.7  C) (Oral)   Resp 10   Ht 1.651 m (5' 5\")   Wt 72.8 kg (160 lb 6.4 oz)   SpO2 97%   BMI 26.69 kg/m    Physical Exam  GENERAL: healthy, alert and no distress  NECK: no adenopathy, no asymmetry, masses, or scars and thyroid normal to palpation  RESP: lungs clear to auscultation - no rales, rhonchi or wheezes  CV: regular rate and rhythm, normal S1 S2, no S3 or S4, no murmur, click or rub, no peripheral edema and peripheral pulses strong  ABDOMEN: soft, nontender, no hepatosplenomegaly, no masses and bowel sounds normal  MS: no gross musculoskeletal defects noted, no edema    Diagnostic Test Results:  Labs reviewed in Epic    Dina Almaguer MD  [facility]  "

## 2024-01-05 ENCOUNTER — HOSPITAL ENCOUNTER (OUTPATIENT)
Dept: CT IMAGING | Facility: HOSPITAL | Age: 59
Discharge: HOME | End: 2024-01-05
Attending: FAMILY MEDICINE | Admitting: FAMILY MEDICINE
Payer: COMMERCIAL

## 2024-01-05 DIAGNOSIS — R91.1 PULMONARY NODULE: ICD-10-CM

## 2024-01-05 PROCEDURE — 71250 CT THORAX DX C-: CPT

## 2024-02-13 ENCOUNTER — TRANSFERRED RECORDS (OUTPATIENT)
Dept: HEALTH INFORMATION MANAGEMENT | Facility: CLINIC | Age: 59
End: 2024-02-13
Payer: COMMERCIAL

## 2024-05-16 SDOH — HEALTH STABILITY: PHYSICAL HEALTH: ON AVERAGE, HOW MANY DAYS PER WEEK DO YOU ENGAGE IN MODERATE TO STRENUOUS EXERCISE (LIKE A BRISK WALK)?: 6 DAYS

## 2024-05-16 SDOH — HEALTH STABILITY: PHYSICAL HEALTH: ON AVERAGE, HOW MANY MINUTES DO YOU ENGAGE IN EXERCISE AT THIS LEVEL?: 20 MIN

## 2024-05-16 ASSESSMENT — SOCIAL DETERMINANTS OF HEALTH (SDOH): HOW OFTEN DO YOU GET TOGETHER WITH FRIENDS OR RELATIVES?: ONCE A WEEK

## 2024-05-17 ENCOUNTER — OFFICE VISIT (OUTPATIENT)
Dept: FAMILY MEDICINE | Facility: CLINIC | Age: 59
End: 2024-05-17
Payer: COMMERCIAL

## 2024-05-17 VITALS
OXYGEN SATURATION: 96 % | RESPIRATION RATE: 16 BRPM | DIASTOLIC BLOOD PRESSURE: 63 MMHG | HEART RATE: 83 BPM | HEIGHT: 65 IN | BODY MASS INDEX: 25.06 KG/M2 | WEIGHT: 150.4 LBS | SYSTOLIC BLOOD PRESSURE: 105 MMHG | TEMPERATURE: 97.6 F

## 2024-05-17 DIAGNOSIS — I95.9 HYPOTENSION, UNSPECIFIED HYPOTENSION TYPE: ICD-10-CM

## 2024-05-17 DIAGNOSIS — I51.7 MILD CARDIOMEGALY: ICD-10-CM

## 2024-05-17 DIAGNOSIS — R55 PRE-SYNCOPE: ICD-10-CM

## 2024-05-17 DIAGNOSIS — Z00.00 ROUTINE GENERAL MEDICAL EXAMINATION AT A HEALTH CARE FACILITY: Primary | ICD-10-CM

## 2024-05-17 DIAGNOSIS — F48.9 MENTAL HEALTH PROBLEM: ICD-10-CM

## 2024-05-17 PROBLEM — D12.2 BENIGN NEOPLASM OF ASCENDING COLON: Status: ACTIVE | Noted: 2019-12-12

## 2024-05-17 PROBLEM — K64.9 HEMORRHOIDS: Status: ACTIVE | Noted: 2024-02-13

## 2024-05-17 LAB
BASOPHILS # BLD AUTO: 0 10E3/UL (ref 0–0.2)
BASOPHILS NFR BLD AUTO: 1 %
EOSINOPHIL # BLD AUTO: 0.6 10E3/UL (ref 0–0.7)
EOSINOPHIL NFR BLD AUTO: 10 %
ERYTHROCYTE [DISTWIDTH] IN BLOOD BY AUTOMATED COUNT: 12.8 % (ref 10–15)
HCT VFR BLD AUTO: 41.4 % (ref 35–47)
HGB BLD-MCNC: 13.6 G/DL (ref 11.7–15.7)
IMM GRANULOCYTES # BLD: 0 10E3/UL
IMM GRANULOCYTES NFR BLD: 0 %
LYMPHOCYTES # BLD AUTO: 1.3 10E3/UL (ref 0.8–5.3)
LYMPHOCYTES NFR BLD AUTO: 23 %
MCH RBC QN AUTO: 30.2 PG (ref 26.5–33)
MCHC RBC AUTO-ENTMCNC: 32.9 G/DL (ref 31.5–36.5)
MCV RBC AUTO: 92 FL (ref 78–100)
MONOCYTES # BLD AUTO: 0.4 10E3/UL (ref 0–1.3)
MONOCYTES NFR BLD AUTO: 7 %
NEUTROPHILS # BLD AUTO: 3.3 10E3/UL (ref 1.6–8.3)
NEUTROPHILS NFR BLD AUTO: 59 %
PLATELET # BLD AUTO: 222 10E3/UL (ref 150–450)
RBC # BLD AUTO: 4.5 10E6/UL (ref 3.8–5.2)
WBC # BLD AUTO: 5.6 10E3/UL (ref 4–11)

## 2024-05-17 PROCEDURE — 85025 COMPLETE CBC W/AUTO DIFF WBC: CPT | Performed by: FAMILY MEDICINE

## 2024-05-17 PROCEDURE — 80053 COMPREHEN METABOLIC PANEL: CPT | Performed by: FAMILY MEDICINE

## 2024-05-17 PROCEDURE — 93246 EXT ECG>7D<15D RECORDING: CPT | Performed by: FAMILY MEDICINE

## 2024-05-17 PROCEDURE — 99396 PREV VISIT EST AGE 40-64: CPT | Mod: 25 | Performed by: FAMILY MEDICINE

## 2024-05-17 PROCEDURE — 99214 OFFICE O/P EST MOD 30 MIN: CPT | Mod: 25 | Performed by: FAMILY MEDICINE

## 2024-05-17 PROCEDURE — 80061 LIPID PANEL: CPT | Performed by: FAMILY MEDICINE

## 2024-05-17 PROCEDURE — 36415 COLL VENOUS BLD VENIPUNCTURE: CPT | Performed by: FAMILY MEDICINE

## 2024-05-17 PROCEDURE — 90471 IMMUNIZATION ADMIN: CPT | Performed by: FAMILY MEDICINE

## 2024-05-17 PROCEDURE — 90715 TDAP VACCINE 7 YRS/> IM: CPT | Performed by: FAMILY MEDICINE

## 2024-05-17 RX ORDER — FLUOXETINE 10 MG/1
1 CAPSULE ORAL EVERY 24 HOURS
COMMUNITY
Start: 2024-02-13

## 2024-05-17 NOTE — PATIENT INSTRUCTIONS
"Preventive Care Advice   This is general advice we often give to help people stay healthy. Your care team may have specific advice just for you. Please talk to your care team about your own preventive care needs.  Lifestyle  Exercise at least 150 minutes each week (30 minutes a day, 5 days a week).  Do muscle strengthening activities 2 days a week. These help control your weight and prevent disease.  No smoking.  Wear sunscreen to prevent skin cancer.  Have your home tested for radon every 2 to 5 years. Radon is a colorless, odorless gas that can harm your lungs. To learn more, go to www.health.[facility].mn. and search for \"Radon in Homes.\"  Keep guns unloaded and locked up in a safe place like a safe or gun vault, or, use a gun lock and hide the keys. Always lock away bullets separately. To learn more, visit [place].gov and search for \"safe gun storage.\"  Nutrition  Eat 5 or more servings of fruits and vegetables each day.  Try wheat bread, brown rice and whole grain pasta (instead of white bread, rice, and pasta).  Get enough calcium and vitamin D. Check the label on foods and aim for 100% of the RDA (recommended daily allowance).  Regular exams  Have a dental exam and cleaning every 6 months.  See your health care team every year to talk about:  Any changes in your health.  Any medicines your care team has prescribed.  Preventive care, family planning, and ways to prevent chronic diseases.  Shots (vaccines)   HPV shots (up to age 26), if you've never had them before.  Hepatitis B shots (up to age 59), if you've never had them before.  COVID-19 shot: Get this shot when it's due.  Flu shot: Get a flu shot every year.  Tetanus shot: Get a tetanus shot every 10 years.  Pneumococcal, hepatitis A, and RSV shots: Ask your care team if you need these based on your risk.  Shingles shot (for age 50 and up).  General health tests  Diabetes screening:  Starting at age 35, Get screened for diabetes at least every 3 years.  If " you are younger than age 35, ask your care team if you should be screened for diabetes.  Cholesterol test: At age 39, start having a cholesterol test every 5 years, or more often if advised.  Bone density scan (DEXA): At age 50, ask your care team if you should have this scan for osteoporosis (brittle bones).  Hepatitis C: Get tested at least once in your life.  Abdominal aortic aneurysm screening: Talk to your doctor about having this screening if you:  Have ever smoked; and  Are biologically male; and  Are between the ages of 65 and 75.  STIs (sexually transmitted infections)  Before age 24: Ask your care team if you should be screened for STIs.  After age 24: Get screened for STIs if you're at risk. You are at risk for STIs (including HIV) if:  You are sexually active with more than one person.  You don't use condoms every time.  You or a partner was diagnosed with a sexually transmitted infection.  If you are at risk for HIV, ask about PrEP medicine to prevent HIV.  Get tested for HIV at least once in your life, whether you are at risk for HIV or not.  Cancer screening tests  Cervical cancer screening: If you have a cervix, begin getting regular cervical cancer screening tests at age 21. Most people who have regular screenings with normal results can stop after age 65. Talk about this with your provider.  Breast cancer scan (mammogram): If you've ever had breasts, begin having regular mammograms starting at age 40. This is a scan to check for breast cancer.  Colon cancer screening: It is important to start screening for colon cancer at age 45.  Have a colonoscopy test every 10 years (or more often if you're at risk) Or, ask your provider about stool tests like a FIT test every year or Cologuard test every 3 years.  To learn more about your testing options, visit: www.MiNOWireless/959588.pdf.  For help making a decision, visit: emili/ci31499.  Prostate cancer screening test: If you have a prostate and are age 55  to 69, ask your provider if you would benefit from a yearly prostate cancer screening test.  Lung cancer screening: If you are a current or former smoker age 50 to 80, ask your care team if ongoing lung cancer screenings are right for you.  For informational purposes only. Not to replace the advice of your health care provider. Copyright   2023 [employer]. All rights reserved. Clinically reviewed by the [facility] Transitions Program. Scanbuy [id number] - REV 04/24.    Learning About Stress  What is stress?     Stress is your body's response to a hard situation. Your body can have a physical, emotional, or mental response. Stress is a fact of life for most people, and it affects everyone differently. What causes stress for you may not be stressful for someone else.  A lot of things can cause stress. You may feel stress when you go on a job interview, take a test, or run a race. This kind of short-term stress is normal and even useful. It can help you if you need to work hard or react quickly. For example, stress can help you finish an important job on time.  Long-term stress is caused by ongoing stressful situations or events. Examples of long-term stress include long-term health problems, ongoing problems at work, or conflicts in your family. Long-term stress can harm your health.  How does stress affect your health?  When you are stressed, your body responds as though you are in danger. It makes hormones that speed up your heart, make you breathe faster, and give you a burst of energy. This is called the fight-or-flight stress response. If the stress is over quickly, your body goes back to normal and no harm is done.  But if stress happens too often or lasts too long, it can have bad effects. Long-term stress can make you more likely to get sick, and it can make symptoms of some diseases worse. If you tense up when you are stressed, you may develop neck, shoulder, or low back pain. Stress is  linked to high blood pressure and heart disease.  Stress also harms your emotional health. It can make you post, tense, or depressed. Your relationships may suffer, and you may not do well at work or school.  What can you do to manage stress?  You can try these things to help manage stress:   Do something active. Exercise or activity can help reduce stress. Walking is a great way to get started. Even everyday activities such as housecleaning or yard work can help.  Try yoga or angelita chi. These techniques combine exercise and meditation. You may need some training at first to learn them.  Do something you enjoy. For example, listen to music or go to a movie. Practice your hobby or do volunteer work.  Meditate. This can help you relax, because you are not worrying about what happened before or what may happen in the future.  Do guided imagery. Imagine yourself in any setting that helps you feel calm. You can use online videos, books, or a teacher to guide you.  Do breathing exercises. For example:  From a standing position, bend forward from the waist with your knees slightly bent. Let your arms dangle close to the floor.  Breathe in slowly and deeply as you return to a standing position. Roll up slowly and lift your head last.  Hold your breath for just a few seconds in the standing position.  Breathe out slowly and bend forward from the waist.  Let your feelings out. Talk, laugh, cry, and express anger when you need to. Talking with supportive friends or family, a counselor, or a kelley leader about your feelings is a healthy way to relieve stress. Avoid discussing your feelings with people who make you feel worse.  Write. It may help to write about things that are bothering you. This helps you find out how much stress you feel and what is causing it. When you know this, you can find better ways to cope.  What can you do to prevent stress?  You might try some of these things to help prevent stress:  Manage your time.  "This helps you find time to do the things you want and need to do.  Get enough sleep. Your body recovers from the stresses of the day while you are sleeping.  Get support. Your family, friends, and community can make a difference in how you experience stress.  Limit your news feed. Avoid or limit time on social media or news that may make you feel stressed.  Do something active. Exercise or activity can help reduce stress. Walking is a great way to get started.  Where can you learn more?  Go to https://www.[employer].net/patiented  Enter N032 in the search box to learn more about \"Learning About Stress.\"  Current as of: October 24, 2023               Content Version: 14.0    8820-8795 [employer].   Care instructions adapted under license by your healthcare professional. If you have questions about a medical condition or this instruction, always ask your healthcare professional. [employer] disclaims any warranty or liability for your use of this information.      "

## 2024-05-17 NOTE — PROGRESS NOTES
"Preventive Care Visit  [facility]  Dina Almaguer MD, Family Medicine  May 17, 2024      Assessment & Plan     ICD-10-CM    1. Routine general medical examination at a health care facility  Z00.00 Lipid panel reflex to direct LDL Fasting     Lipid panel reflex to direct LDL Fasting      2. Pre-syncope  R55 CBC with platelets and differential     Comprehensive metabolic panel (BMP + Alb, Alk Phos, ALT, AST, Total. Bili, TP)     Echocardiogram Complete     ZIO PATCH 8-14 DAYS APPLICATION     Adult Cardiology Eval  Referral     CBC with platelets and differential     Comprehensive metabolic panel (BMP + Alb, Alk Phos, ALT, AST, Total. Bili, TP)      3. Hypotension, unspecified hypotension type  I95.9 Adult Cardiology Eval  Referral      4. Mild cardiomegaly  I51.7 Echocardiogram Complete     Adult Cardiology Eval  Referral      5. Mental health problem  F48.9         Patient presents today for routine physical exam.  She has a concern about feeling dizzy with low blood pressure noted on more than 1 occasion.  Initial blood pressure also was low at this clinic.  About couple of days ago she was seen in the urgency room for presyncope.  An EKG and blood work at that visit was normal.  I have reviewed her chart and her lab work and CT scan.  She was incidentally noted to have mild cardiomegaly during a screening CT scan.  We will start with doing an echocardiogram for noted cardiomegaly and presyncope.  I would also do event monitor to rule out any arrhythmia.  I have advised her to stay well-hydrated.  A referral to cardiology has been done noting these hypotensive episodes.  Labs as above.  For her mental health problem, she follows with [facility] and is currently on Prozac.    BMI  Estimated body mass index is 25.03 kg/m  as calculated from the following:    Height as of this encounter: 1.651 m (5' 5\").    Weight as of this encounter: 68.2 kg (150 " lb 6.4 oz).       Counseling  Appropriate preventive services were discussed with this patient, including applicable screening as appropriate for fall prevention, nutrition, physical activity, Tobacco-use cessation, weight loss and cognition.  Checklist reviewing preventive services available has been given to the patient.  Reviewed patient's diet, addressing concerns and/or questions.   She is at risk for psychosocial distress and has been provided with information to reduce risk.       MEDICATIONS:  Continue current medications without change    Subjective   Katey is a 58 year old, presenting for the following:  Physical (Fasting labs, /Lowering blood pressure, been getting lightheaded. /Discuss CT result from 01/05/24)        5/17/2024     7:33 AM   Additional Questions   Roomed by Mali RDZ CMA        Health Care Directive  Patient does not have a Health Care Directive or Living Will: Discussed advance care planning with patient; however, patient declined at this time.    HPI        5/16/2024   General Health   How would you rate your overall physical health? Good   Feel stress (tense, anxious, or unable to sleep) Only a little   (!) STRESS CONCERN      5/16/2024   Nutrition   Three or more servings of calcium each day? Yes   Diet: Regular (no restrictions)   How many servings of fruit and vegetables per day? (!) 2-3   How many sweetened beverages each day? 0-1         5/16/2024   Exercise   Days per week of moderate/strenous exercise 6 days   Average minutes spent exercising at this level 20 min         5/16/2024   Social Factors   Frequency of gathering with friends or relatives Once a week   Worry food won't last until get money to buy more No   Food not last or not have enough money for food? No   Do you have housing?  Yes   Are you worried about losing your housing? No   Lack of transportation? No   Unable to get utilities (heat,electricity)? No         5/16/2024   Fall Risk   Fallen 2 or more times in  the past year? No   Trouble with walking or balance? No          5/16/2024   Dental   Dentist two times every year? Yes         5/16/2024   TB Screening   Were you born outside of the [place]? No         Today's PHQ-2 Score:       5/16/2024     9:10 PM   PHQ-2 ( 1999 [employer])   Q1: Little interest or pleasure in doing things 0   Q2: Feeling down, depressed or hopeless 0   PHQ-2 Score 0   Q1: Little interest or pleasure in doing things Not at all   Q2: Feeling down, depressed or hopeless Not at all   PHQ-2 Score 0           5/16/2024   Substance Use   Alcohol more than 3/day or more than 7/wk No   Do you use any other substances recreationally? No     Social History     Tobacco Use    Smoking status: Never     Passive exposure: Never    Smokeless tobacco: Never   Vaping Use    Vaping status: Never Used   Substance Use Topics    Alcohol use: Not Currently    Drug use: No             5/16/2024   Breast Cancer Screening   Family history of breast, colon, or ovarian cancer? No / Unknown         3/14/2023   LAST FHS-7 RESULTS   1st degree relative breast or ovarian cancer No   Any relative bilateral breast cancer No   Any male have breast cancer No   Any ONE woman have BOTH breast AND ovarian cancer No   Any woman with breast cancer before 50yrs No   2 or more relatives with breast AND/OR ovarian cancer No   2 or more relatives with breast AND/OR bowel cancer No        Mammogram Screening - Mammogram every 1-2 years updated in Health Maintenance based on mutual decision making        5/16/2024   STI Screening   New sexual partner(s) since last STI/HIV test? No     History of abnormal Pap smear: No - age 30- 64 PAP with HPV every 5 years recommended        Latest Ref Rng & Units 10/26/2021     1:16 PM 10/25/2016     5:14 PM   PAP / HPV   PAP  Negative for Intraepithelial Lesion or Malignancy (NILM)  Negative for squamous intraepithelial lesion or malignancy  Electronically signed by Tasha Eduardo CT (ASCP) on 11/1/2016 at  11:15 AM      HPV 16 DNA Negative Negative     HPV 18 DNA Negative Negative     Other HR HPV Negative Negative       ASCVD Risk   The 10-year ASCVD risk score (Maria Victoria DOWLING, et al., 2019) is: 1.4%    Values used to calculate the score:      Age: 58 years      Sex: Female      Is Non- : No      Diabetic: No      Tobacco smoker: No      Systolic Blood Pressure: 105 mmHg      Is BP treated: No      HDL Cholesterol: 75 mg/dL      Total Cholesterol: 245 mg/dL           Reviewed and updated as needed this visit by Provider   Tobacco  Allergies  Meds  Problems  Med Hx  Surg Hx  Fam Hx            Past Medical History:   Diagnosis Date    Allergic rhinitis     Anxiety     Chronic sinusitis     Depressive disorder     Hearing problem     Nasal polyps     Sinus problem     Uncomplicated asthma      Past Surgical History:   Procedure Laterality Date    ANTROSTOMY NASAL Bilateral 8/11/2017    Procedure: ANTROSTOMY NASAL;;  Surgeon: Dorothy Garcia MD;  Location: [facility] OR    ENDOSCOPIC POLYPECTOMY NASAL Bilateral 8/11/2017    Procedure: ENDOSCOPIC POLYPECTOMY NASAL;  Bilateral Polypectomy, Bilateral Maxillary Antrostomy, Ethmoidectomy;  Surgeon: Dorothy Garcia MD;  Location: [facility] OR    NASAL POLYP SURGERY      NASAL/SINUS POLYPECTOMY      NO HISTORY OF SURGERY       BP Readings from Last 3 Encounters:   05/17/24 105/63   05/12/23 92/64   10/24/22 117/82    Wt Readings from Last 3 Encounters:   05/17/24 68.2 kg (150 lb 6.4 oz)   05/12/23 72.8 kg (160 lb 6.4 oz)   10/24/22 74.3 kg (163 lb 14.4 oz)                  Patient Active Problem List   Diagnosis    Allergic rhinitis    Overweight    Benign neoplasm of ascending colon    Hemorrhoids     Past Surgical History:   Procedure Laterality Date    ANTROSTOMY NASAL Bilateral 8/11/2017    Procedure: ANTROSTOMY NASAL;;  Surgeon: Dorothy Garcia MD;  Location: [facility] OR    ENDOSCOPIC POLYPECTOMY NASAL Bilateral 8/11/2017    Procedure: ENDOSCOPIC POLYPECTOMY  "NASAL;  Bilateral Polypectomy, Bilateral Maxillary Antrostomy, Ethmoidectomy;  Surgeon: Dorothy Garcia MD;  Location: [facility] OR    NASAL POLYP SURGERY      NASAL/SINUS POLYPECTOMY      NO HISTORY OF SURGERY         Social History     Tobacco Use    Smoking status: Never     Passive exposure: Never    Smokeless tobacco: Never   Substance Use Topics    Alcohol use: Not Currently     Family History   Problem Relation Age of Onset    Heart Disease Mother     Atrial fibrillation Mother     Heart Failure Mother     Coronary Artery Disease Mother         Afib    No Known Problems Father     Lung Cancer Maternal Grandmother         smoker    Heart Disease Maternal Grandfather     Breast Cancer No family hx of     Ovarian Cancer No family hx of          Current Outpatient Medications   Medication Sig Dispense Refill    fexofenadine (ALLEGRA ALLERGY) 180 MG tablet Take 180 mg by mouth daily      FLUoxetine (PROZAC) 10 MG capsule Take 1 capsule by mouth every 24 hours      triamcinolone (NASACORT) 55 MCG/ACT Inhaler Spray 2 sprays into both nostrils daily           Review of Systems  Constitutional, HEENT, cardiovascular, pulmonary, GI, , musculoskeletal, neuro, skin, endocrine and psych systems are negative, except as otherwise noted.     Objective    Exam  /63 (BP Location: Left arm, Patient Position: Sitting, Cuff Size: Adult Regular)   Pulse 83   Temp 97.6  F (36.4  C) (Oral)   Resp 16   Ht 1.651 m (5' 5\")   Wt 68.2 kg (150 lb 6.4 oz)   SpO2 96%   BMI 25.03 kg/m     Estimated body mass index is 25.03 kg/m  as calculated from the following:    Height as of this encounter: 1.651 m (5' 5\").    Weight as of this encounter: 68.2 kg (150 lb 6.4 oz).    Physical Exam  GENERAL: alert and no distress  NECK: no adenopathy, no asymmetry, masses, or scars  RESP: lungs clear to auscultation - no rales, rhonchi or wheezes  CV: regular rate and rhythm, normal S1 S2, no S3 or S4, no murmur, click or rub, no peripheral " edema  ABDOMEN: soft, nontender, no hepatosplenomegaly, no masses and bowel sounds normal  MS: no gross musculoskeletal defects noted, no edema        Signed Electronically by: Dina Almaguer MD

## 2024-05-18 LAB
ALBUMIN SERPL BCG-MCNC: 4.5 G/DL (ref 3.5–5.2)
ALP SERPL-CCNC: 65 U/L (ref 40–150)
ALT SERPL W P-5'-P-CCNC: 22 U/L (ref 0–50)
ANION GAP SERPL CALCULATED.3IONS-SCNC: 10 MMOL/L (ref 7–15)
AST SERPL W P-5'-P-CCNC: 25 U/L (ref 0–45)
BILIRUB SERPL-MCNC: 0.5 MG/DL
BUN SERPL-MCNC: 16.9 MG/DL (ref 6–20)
CALCIUM SERPL-MCNC: 9.8 MG/DL (ref 8.6–10)
CHLORIDE SERPL-SCNC: 102 MMOL/L (ref 98–107)
CHOLEST SERPL-MCNC: 231 MG/DL
CREAT SERPL-MCNC: 0.8 MG/DL (ref 0.51–0.95)
DEPRECATED HCO3 PLAS-SCNC: 26 MMOL/L (ref 22–29)
EGFRCR SERPLBLD CKD-EPI 2021: 86 ML/MIN/1.73M2
FASTING STATUS PATIENT QL REPORTED: YES
FASTING STATUS PATIENT QL REPORTED: YES
GLUCOSE SERPL-MCNC: 98 MG/DL (ref 70–99)
HDLC SERPL-MCNC: 65 MG/DL
LDLC SERPL CALC-MCNC: 153 MG/DL
NONHDLC SERPL-MCNC: 166 MG/DL
POTASSIUM SERPL-SCNC: 4 MMOL/L (ref 3.4–5.3)
PROT SERPL-MCNC: 7.2 G/DL (ref 6.4–8.3)
SODIUM SERPL-SCNC: 138 MMOL/L (ref 135–145)
TRIGL SERPL-MCNC: 63 MG/DL

## 2024-06-06 ENCOUNTER — HOSPITAL ENCOUNTER (OUTPATIENT)
Dept: CARDIOLOGY | Facility: HOSPITAL | Age: 59
Discharge: HOME | End: 2024-06-06
Attending: FAMILY MEDICINE | Admitting: FAMILY MEDICINE
Payer: COMMERCIAL

## 2024-06-06 DIAGNOSIS — R55 PRE-SYNCOPE: ICD-10-CM

## 2024-06-06 DIAGNOSIS — I51.7 MILD CARDIOMEGALY: ICD-10-CM

## 2024-06-06 LAB — LVEF ECHO: NORMAL

## 2024-06-06 PROCEDURE — 93306 TTE W/DOPPLER COMPLETE: CPT | Mod: 26 | Performed by: INTERNAL MEDICINE

## 2024-06-06 PROCEDURE — 93306 TTE W/DOPPLER COMPLETE: CPT

## 2024-07-22 ENCOUNTER — OFFICE VISIT (OUTPATIENT)
Dept: CARDIOLOGY | Facility: CLINIC | Age: 59
End: 2024-07-22
Attending: FAMILY MEDICINE
Payer: COMMERCIAL

## 2024-07-22 VITALS
DIASTOLIC BLOOD PRESSURE: 54 MMHG | BODY MASS INDEX: 24.49 KG/M2 | RESPIRATION RATE: 14 BRPM | SYSTOLIC BLOOD PRESSURE: 100 MMHG | WEIGHT: 147 LBS | HEIGHT: 65 IN | HEART RATE: 76 BPM

## 2024-07-22 DIAGNOSIS — R53.83 OTHER FATIGUE: ICD-10-CM

## 2024-07-22 DIAGNOSIS — R55 PRE-SYNCOPE: ICD-10-CM

## 2024-07-22 DIAGNOSIS — I51.7 MILD CARDIOMEGALY: Primary | ICD-10-CM

## 2024-07-22 DIAGNOSIS — E78.5 DYSLIPIDEMIA: ICD-10-CM

## 2024-07-22 PROCEDURE — 99204 OFFICE O/P NEW MOD 45 MIN: CPT | Performed by: INTERNAL MEDICINE

## 2024-07-22 RX ORDER — VITAMIN B COMPLEX
1 TABLET ORAL DAILY
COMMUNITY

## 2024-07-22 RX ORDER — BIOTIN 1 MG
1000 TABLET ORAL DAILY
COMMUNITY

## 2024-07-22 NOTE — LETTER
7/22/2024    Dina Almaguer MD  [address]  [facility] [address]    RE: Katey Allen       Dear Colleague,     I had the pleasure of seeing Kate[facility] SOHAIL Allen in [facility].      Click to link to [facility] HEART CARE NOTE    Thank you, Dr. Almaguer, for asking me to see Katey Allen in consultation at [facility] to evaluate presyncope and mild cardiomyopathy.      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  She did not complain shortness of breath, no leg edema.  Her chest CT with noncontrast images in January 2024 are personally reviewed, no calcified coronary atherosclerotic plaque, less likely mild cardiomyopathy caused by significant coronary artery disease.  She has no significant cardiac history from mother side, not no family history from father side.  We discussed the evaluation and management.  Her mild cardiomyopathy most likely was caused by alcohol abuse.  She drank 1 bottle of hard liquor daily for many years.  Before she quit alcohol abuse in January, she developed bilateral leg edema which could be caused by alcohol induced cardiomyopathy.  After she quit drinking, her leg edema will resolved.  Her LV systolic function most likely improved.  She had echocardiogram on June 6, 2024 which was reported mild left ventricular dilation and LVEF 50 to 55%.  Discussed cardiac MRI for further evaluation of the etiology of cardiomyopathy.  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  If LV size and systolic function is not improved to normal range after 6 months, she is willing to have a cardiac MRI for further cardiac evaluation.  Presyncope: Most likely secondary to vasovagal reaction based on her clinical presentation.  Advised her to drink enough  of fluid, regular exercise, compression socks above knees.  Continue to monitor.  The patient has no palpitations.  Fatigue: Could be related to long history of insomnia.  Dyslipidemia: Continue lifestyle modification.  No calcified plaque is identified on the imaging of CT with noncontrast in January 2024.    Thank you for the opportunity to be involved in the care of Katey Allen. If you have any questions, please feel free to contact me.  I will see the patient again in 12 months and as needed    Much or all of the text in this note was generated through the use of Dragon Dictate voice-to-text software. Errors in spelling or words which seem out of context are unintentional.   Sound alike errors, in particular, may have escaped editing.       History of Present Illness:   It is my pleasure to see Katey Allen at the [facility] clinic for evaluation of New Patient. Katey Allen is a 58 year old female with a medical history of dyslipidemia, history of heavy alcohol abuse, quit drinking 7 months ago.    The patient is referred to cardiology clinic for evaluation of mild cardiomyopathy, presyncopal episode.    She states that she has intermittent lightheadedness.  Her blood pressure has been around 100-110/50-60 mmHg.  She had 1 episode of syncope several years ago.  At that time, she was found to have a low blood pressure, bradycardia.  She described the symptoms including blurry vision, dizziness, diaphoresis, passed out.  She had another episode 2 months ago with systolic blood pressure 80.  She denies chest pain, palpitations, shortness of breath, orthopnea, PND or leg edema.  She complains of fatigue, however, she did not have a good sleep for many years.  She feels easily to be tired.      She drink a bottle of white wine daily for long time.  She quit drinking in January 2024.    Her echocardiogram was reported mildly dilated left ventricle with LVEF of 50 to 55%, normal right  ventricular size and systolic function, no valvular disease.  ECG has no significant abnormality.    Past Medical History:     Patient Active Problem List   Diagnosis    Allergic rhinitis    Overweight    Benign neoplasm of ascending colon    Hemorrhoids       Past Surgical History:     Past Surgical History:   Procedure Laterality Date    ANTROSTOMY NASAL Bilateral 8/11/2017    Procedure: ANTROSTOMY NASAL;;  Surgeon: Dorothy Garcia MD;  [facility]    ENDOSCOPIC POLYPECTOMY NASAL Bilateral 8/11/2017    Procedure: ENDOSCOPIC POLYPECTOMY NASAL;  Bilateral Polypectomy, Bilateral Maxillary Antrostomy, Ethmoidectomy;  Surgeon: Dorothy Garcia MD;  [facility]: [facility] OR    NASAL POLYP SURGERY      NASAL/SINUS POLYPECTOMY      NO HISTORY OF SURGERY         Family History:     Family History   Problem Relation Age of Onset    Heart Disease Mother     Atrial fibrillation Mother     Heart Failure Mother     Coronary Artery Disease Mother         Afib    No Known Problems Father     Lung Cancer Maternal Grandmother         smoker    Heart Disease Maternal Grandfather     Breast Cancer No family hx of     Ovarian Cancer No family hx of        Social History:    reports that she has never smoked. She has never been exposed to tobacco smoke. She has never used smokeless tobacco. She reports that she does not currently use alcohol. She reports that she does not use drugs.    Review of Systems:   12 systems are reviewed negative except for in HPI.    Meds:     Current Outpatient Medications:     biotin 1000 MCG TABS tablet, Take 1,000 mcg by mouth daily, Disp: , Rfl:     fexofenadine (ALLEGRA ALLERGY) 180 MG tablet, Take 180 mg by mouth daily, Disp: , Rfl:     FLUoxetine (PROZAC) 10 MG capsule, Take 1 capsule by mouth every 24 hours, Disp: , Rfl:     Multiple Vitamin (MULTIVITAMIN ADULT PO), Take 1 tablet by mouth daily, Disp: , Rfl:     Probiotic Product (PROBIOTIC BLEND PO), Take 1 capsule by mouth daily, Disp: , Rfl:      "triamcinolone (NASACORT) 55 MCG/ACT Inhaler, Spray 2 sprays into both nostrils daily, Disp: , Rfl:     Vitamin D3 (CHOLECALCIFEROL) 25 mcg (1000 units) tablet, Take 1 tablet by mouth daily, Disp: , Rfl:      Allergies:   Penicillins    Objective:      Physical Exam  66.7 kg (147 lb)  1.651 m (5' 5\")  Body mass index is 24.46 kg/m .  /54 (BP Location: Right arm, Patient Position: Sitting, Cuff Size: Adult Regular)   Pulse 76   Resp 14   Ht 1.651 m (5' 5\")   Wt 66.7 kg (147 lb)   BMI 24.46 kg/m      General Appearance:   Awake, Alert, No acute distress.   HEENT:  Pupil equal, reactive to light. No scleral icterus; the mucous membranes were moist. No oral ulcers or thrush.    Neck: No cervical bruits. No JVD. No thyromegaly. No lymph node enlargement or tenderness.   Chest: The spine was straight. The chest was symmetric.   Lungs:   Respirations unlabored. Lungs are clear to auscultation. No crackles. No wheezing.   Cardiovascular:   RRR, normal first and second heart sounds with no murmurs. No rubs or gallops.    Abdomen:  Soft. No tenderness. Non-distended. Bowels sounds are present   Extremities: Equal posterior tibial pulses. No leg edema.   Skin: No rashes or ulcers. Warm, Dry.   Musculoskeletal: No tenderness. No deformity.   Neurologic: Mood and affect are appropriate. No focal deficits.         EKG:  Personally reivewed  Normal sinus rhythm  Incomplete right bundle branch block  No ischemic ST-T change    Cardiac Imaging Studies  Echo on June 6, 2024:  1.Left ventricular function is decreased. The ejection fraction is 50-55%  (borderline).  2.The left ventricle is borderline dilated.  3.Normal right ventricle size and systolic function.  4.No hemodynamically significant valvular abnormalities on 2D or color flow  imaging.  There is no comparison study available.    Lab Review   Lab Results   Component Value Date     05/17/2024    CO2 26 05/17/2024    CO2 31 10/24/2022    BUN 16.9 05/17/2024    " BUN 18 10/24/2022     Lab Results   Component Value Date    WBC 5.6 05/17/2024    HGB 13.6 05/17/2024    HCT 41.4 05/17/2024    MCV 92 05/17/2024     05/17/2024     Lab Results   Component Value Date    CHOL 231 05/17/2024    TRIG 63 05/17/2024    HDL 65 05/17/2024     05/17/2024     LDL Cholesterol Calculated   Date Value Ref Range Status   05/17/2024 153 (H) <=100 mg/dL Final     Lab Results   Component Value Date    TSH 2.14 10/26/2021                 Thank you for allowing me to participate in the care of your patient.      Sincerely,     Sheri Pimentel MD     [facility]  cc:   Dina Almaguer MD  [address] [place] [address]   3

## 2024-07-22 NOTE — PROGRESS NOTES
Click to link to [facility] HEART CARE NOTE    Thank you, Dr. Almaguer, for asking me to see Katey Allen in consultation at [facility] to evaluate presyncope and mild cardiomyopathy.      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  She did not complain shortness of breath, no leg edema.  Her chest CT with noncontrast images in January 2024 are personally reviewed, no calcified coronary atherosclerotic plaque, less likely mild cardiomyopathy caused by significant coronary artery disease.  She has no significant cardiac history from mother side, not no family history from father side.  We discussed the evaluation and management.  Her mild cardiomyopathy most likely was caused by alcohol abuse.  She drank 1 bottle of hard liquor daily for many years.  Before she quit alcohol abuse in January, she developed bilateral leg edema which could be caused by alcohol induced cardiomyopathy.  After she quit drinking, her leg edema will resolved.  Her LV systolic function most likely improved.  She had echocardiogram on June 6, 2024 which was reported mild left ventricular dilation and LVEF 50 to 55%.  Discussed cardiac MRI for further evaluation of the etiology of cardiomyopathy.  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  If LV size and systolic function is not improved to normal range after 6 months, she is willing to have a cardiac MRI for further cardiac evaluation.  Presyncope: Most likely secondary to vasovagal reaction based on her clinical presentation.  Advised her to drink enough of fluid, regular exercise, compression socks above knees.  Continue to monitor.  The patient has no palpitations.  Fatigue: Could be related to long history of insomnia.  Dyslipidemia: Continue lifestyle  modification.  No calcified plaque is identified on the imaging of CT with noncontrast in January 2024.    Thank you for the opportunity to be involved in the care of Katey Allen. If you have any questions, please feel free to contact me.  I will see the patient again in 12 months and as needed    Much or all of the text in this note was generated through the use of Dragon Dictate voice-to-text software. Errors in spelling or words which seem out of context are unintentional.   Sound alike errors, in particular, may have escaped editing.       History of Present Illness:   It is my pleasure to see Katey Allen at the [facility] clinic for evaluation of New Patient. Katey Allen is a 58 year old female with a medical history of dyslipidemia, history of heavy alcohol abuse, quit drinking 7 months ago.    The patient is referred to cardiology clinic for evaluation of mild cardiomyopathy, presyncopal episode.    She states that she has intermittent lightheadedness.  Her blood pressure has been around 100-110/50-60 mmHg.  She had 1 episode of syncope several years ago.  At that time, she was found to have a low blood pressure, bradycardia.  She described the symptoms including blurry vision, dizziness, diaphoresis, passed out.  She had another episode 2 months ago with systolic blood pressure 80.  She denies chest pain, palpitations, shortness of breath, orthopnea, PND or leg edema.  She complains of fatigue, however, she did not have a good sleep for many years.  She feels easily to be tired.      She drink a bottle of white wine daily for long time.  She quit drinking in January 2024.    Her echocardiogram was reported mildly dilated left ventricle with LVEF of 50 to 55%, normal right ventricular size and systolic function, no valvular disease.  ECG has no significant abnormality.    Past Medical History:     Patient Active Problem List   Diagnosis    Allergic rhinitis    Overweight     Benign neoplasm of ascending colon    Hemorrhoids       Past Surgical History:     Past Surgical History:   Procedure Laterality Date    ANTROSTOMY NASAL Bilateral 8/11/2017    Procedure: ANTROSTOMY NASAL;;  Surgeon: Dorothy Garcia MD;  [facility]    ENDOSCOPIC POLYPECTOMY NASAL Bilateral 8/11/2017    Procedure: ENDOSCOPIC POLYPECTOMY NASAL;  Bilateral Polypectomy, Bilateral Maxillary Antrostomy, Ethmoidectomy;  Surgeon: Dorothy Garcia MD;  [facility]    NASAL POLYP SURGERY      NASAL/SINUS POLYPECTOMY      NO HISTORY OF SURGERY         Family History:     Family History   Problem Relation Age of Onset    Heart Disease Mother     Atrial fibrillation Mother     Heart Failure Mother     Coronary Artery Disease Mother         Afib    No Known Problems Father     Lung Cancer Maternal Grandmother         smoker    Heart Disease Maternal Grandfather     Breast Cancer No family hx of     Ovarian Cancer No family hx of        Social History:    reports that she has never smoked. She has never been exposed to tobacco smoke. She has never used smokeless tobacco. She reports that she does not currently use alcohol. She reports that she does not use drugs.    Review of Systems:   12 systems are reviewed negative except for in HPI.    Meds:     Current Outpatient Medications:     biotin 1000 MCG TABS tablet, Take 1,000 mcg by mouth daily, Disp: , Rfl:     fexofenadine (ALLEGRA ALLERGY) 180 MG tablet, Take 180 mg by mouth daily, Disp: , Rfl:     FLUoxetine (PROZAC) 10 MG capsule, Take 1 capsule by mouth every 24 hours, Disp: , Rfl:     Multiple Vitamin (MULTIVITAMIN ADULT PO), Take 1 tablet by mouth daily, Disp: , Rfl:     Probiotic Product (PROBIOTIC BLEND PO), Take 1 capsule by mouth daily, Disp: , Rfl:     triamcinolone (NASACORT) 55 MCG/ACT Inhaler, Spray 2 sprays into both nostrils daily, Disp: , Rfl:     Vitamin D3 (CHOLECALCIFEROL) 25 mcg (1000 units) tablet, Take 1 tablet by mouth daily, Disp: , Rfl:   "    Allergies:   Penicillins    Objective:      Physical Exam  66.7 kg (147 lb)  1.651 m (5' 5\")  Body mass index is 24.46 kg/m .  /54 (BP Location: Right arm, Patient Position: Sitting, Cuff Size: Adult Regular)   Pulse 76   Resp 14   Ht 1.651 m (5' 5\")   Wt 66.7 kg (147 lb)   BMI 24.46 kg/m      General Appearance:   Awake, Alert, No acute distress.   HEENT:  Pupil equal, reactive to light. No scleral icterus; the mucous membranes were moist. No oral ulcers or thrush.    Neck: No cervical bruits. No JVD. No thyromegaly. No lymph node enlargement or tenderness.   Chest: The spine was straight. The chest was symmetric.   Lungs:   Respirations unlabored. Lungs are clear to auscultation. No crackles. No wheezing.   Cardiovascular:   RRR, normal first and second heart sounds with no murmurs. No rubs or gallops.    Abdomen:  Soft. No tenderness. Non-distended. Bowels sounds are present   Extremities: Equal posterior tibial pulses. No leg edema.   Skin: No rashes or ulcers. Warm, Dry.   Musculoskeletal: No tenderness. No deformity.   Neurologic: Mood and affect are appropriate. No focal deficits.         EKG:  Personally reivewed  Normal sinus rhythm  Incomplete right bundle branch block  No ischemic ST-T change    Cardiac Imaging Studies  Echo on June 6, 2024:  1.Left ventricular function is decreased. The ejection fraction is 50-55%  (borderline).  2.The left ventricle is borderline dilated.  3.Normal right ventricle size and systolic function.  4.No hemodynamically significant valvular abnormalities on 2D or color flow  imaging.  There is no comparison study available.    Lab Review   Lab Results   Component Value Date     05/17/2024    CO2 26 05/17/2024    CO2 31 10/24/2022    BUN 16.9 05/17/2024    BUN 18 10/24/2022     Lab Results   Component Value Date    WBC 5.6 05/17/2024    HGB 13.6 05/17/2024    HCT 41.4 05/17/2024    MCV 92 05/17/2024     05/17/2024     Lab Results   Component Value " Date    CHOL 231 05/17/2024    TRIG 63 05/17/2024    HDL 65 05/17/2024     05/17/2024     LDL Cholesterol Calculated   Date Value Ref Range Status   05/17/2024 153 (H) <=100 mg/dL Final     Lab Results   Component Value Date    TSH 2.14 10/26/2021

## 2024-11-26 ENCOUNTER — ALLIED HEALTH/NURSE VISIT (OUTPATIENT)
Dept: FAMILY MEDICINE | Facility: CLINIC | Age: 59
End: 2024-11-26
Payer: COMMERCIAL

## 2024-11-26 ENCOUNTER — OFFICE VISIT (OUTPATIENT)
Dept: FAMILY MEDICINE | Facility: CLINIC | Age: 59
End: 2024-11-26
Payer: COMMERCIAL

## 2024-11-26 VITALS
OXYGEN SATURATION: 98 % | TEMPERATURE: 97.9 F | RESPIRATION RATE: 18 BRPM | HEIGHT: 65 IN | BODY MASS INDEX: 23.99 KG/M2 | HEART RATE: 74 BPM | WEIGHT: 144 LBS | DIASTOLIC BLOOD PRESSURE: 60 MMHG | SYSTOLIC BLOOD PRESSURE: 93 MMHG

## 2024-11-26 VITALS — RESPIRATION RATE: 18 BRPM | HEART RATE: 88 BPM | OXYGEN SATURATION: 96 %

## 2024-11-26 DIAGNOSIS — J18.9 WALKING PNEUMONIA: Primary | ICD-10-CM

## 2024-11-26 DIAGNOSIS — R06.02 SHORTNESS OF BREATH: Primary | ICD-10-CM

## 2024-11-26 PROCEDURE — 99207 PR NO CHARGE NURSE ONLY: CPT

## 2024-11-26 PROCEDURE — 99213 OFFICE O/P EST LOW 20 MIN: CPT | Performed by: FAMILY MEDICINE

## 2024-11-26 RX ORDER — PREDNISONE 20 MG/1
40 TABLET ORAL DAILY
Qty: 10 TABLET | Refills: 0 | Status: SHIPPED | OUTPATIENT
Start: 2024-11-26 | End: 2024-12-01

## 2024-11-26 RX ORDER — ALBUTEROL SULFATE 90 UG/1
2 INHALANT RESPIRATORY (INHALATION) EVERY 6 HOURS PRN
Qty: 18 G | Refills: 1 | Status: SHIPPED | OUTPATIENT
Start: 2024-11-26 | End: 2024-11-26

## 2024-11-26 RX ORDER — AZITHROMYCIN 250 MG/1
TABLET, FILM COATED ORAL
Qty: 6 TABLET | Refills: 0 | Status: SHIPPED | OUTPATIENT
Start: 2024-11-26 | End: 2024-12-01

## 2024-11-26 RX ORDER — ALBUTEROL SULFATE 90 UG/1
2 INHALANT RESPIRATORY (INHALATION) EVERY 4 HOURS PRN
Qty: 18 G | Refills: 1 | Status: SHIPPED | OUTPATIENT
Start: 2024-11-26

## 2024-11-26 ASSESSMENT — ENCOUNTER SYMPTOMS: COUGH: 1

## 2024-11-26 NOTE — PROGRESS NOTES
"  {PROVIDER CHARTING PREFERENCE:016395}    Subjective   Katey is a 59 year old, presenting for the following health issues:  Cough (Been having it couple weeks) and sob (2 weeks)      11/26/2024     3:04 PM   Additional Questions   Roomed by ally burciaga   Accompanied by self     Has used inhalers in past - not available now -     Was sick - COVID tests negative   Takes allergy medicine twice a day -   Dark sputum in morning  In day - whiter yellow -       Using Mucinex     Has had \"bronchitis my whole life\"      History of Present Illness       Reason for visit:  Would like an inhaler to help with breathing  Symptom onset:  1-2 weeks ago  Symptoms include:  Difficulty getting a full breath  Symptom intensity:  Moderate  Symptom progression:  Staying the same  Had these symptoms before:  Yes  Has tried/received treatment for these symptoms:  Yes  Previous treatment was successful:  Yes  Prior treatment description:  Albuterol  What makes it worse:  Not sure  What makes it better:  Not sure   She is taking medications regularly.       {MA/LPN/RN Pre-Provider Visit Orders- hCG/UA/Strep (Optional):643735}  {SUPERLIST (Optional):550016}  {additonal problems for provider to add (Optional):287408}    {ROS Picklists (Optional):462967}      Objective    BP 93/60 (BP Location: Right arm, Patient Position: Sitting, Cuff Size: Adult Regular)   Pulse 74   Temp 97.9  F (36.6  C) (Oral)   Resp 18   Ht 1.651 m (5' 5\")   Wt 65.3 kg (144 lb)   SpO2 98%   BMI 23.96 kg/m    Body mass index is 23.96 kg/m .  Physical Exam   {Exam List (Optional):536802}    {Diagnostic Test Results (Optional):650133}        Signed Electronically by: SHAUN LOPES MD Prior to immunization administration, verified patients identity using patient s name and date of birth. Please see Immunization Activity for additional information.     Screening Questionnaire for Adult Immunization    Are you sick today?   Don't Know   Do you have " allergies to medications, food, a vaccine component or latex?   Yes   Have you ever had a serious reaction after receiving a vaccination?   No   Do you have a long-term health problem with heart, lung, kidney, or metabolic disease (e.g., diabetes), asthma, a blood disorder, no spleen, complement component deficiency, a cochlear implant, or a spinal fluid leak?  Are you on long-term aspirin therapy?   Yes   Do you have cancer, leukemia, HIV/AIDS, or any other immune system problem?   No   Do you have a parent, brother, or sister with an immune system problem?   No   In the past 3 months, have you taken medications that affect  your immune system, such as prednisone, other steroids, or anticancer drugs; drugs for the treatment of rheumatoid arthritis, Crohn s disease, or psoriasis; or have you had radiation treatments?   No   Have you had a seizure, or a brain or other nervous system problem?   No   During the past year, have you received a transfusion of blood or blood    products, or been given immune (gamma) globulin or antiviral drug?   No   For women: Are you pregnant or is there a chance you could become       pregnant during the next month?   No   Have you received any vaccinations in the past 4 weeks?   No     Immunization questionnaire was positive for at least one answer.  Notified       Patient instructed to remain in clinic for 15 minutes afterwards, and to report any adverse reactions.     Screening performed by Tomi Nash MA on 11/26/2024 at 3:09 PM.       {Email feedback regarding this note to [email or web address]   :[id number]}        General: No focal deficit present.      Mental Status: She is alert.   Psychiatric:         Mood and Affect: Mood normal.         Behavior: Behavior normal.            No results found for any visits on 11/26/24.        Signed Electronically by: SHAUN LOPES MD Prior to immunization administration, verified patients identity using patient s name and date of birth. Please see Immunization Activity for additional information.     Screening Questionnaire for Adult Immunization    Are you sick today?   Don't Know   Do you have allergies to medications, food, a vaccine component or latex?   Yes   Have you ever had a serious reaction after receiving a vaccination?   No   Do you have a long-term health problem with heart, lung, kidney, or metabolic disease (e.g., diabetes), asthma, a blood disorder, no spleen, complement component deficiency, a cochlear implant, or a spinal fluid leak?  Are you on long-term aspirin therapy?   Yes   Do you have cancer, leukemia, HIV/AIDS, or any other immune system problem?   No   Do you have a parent, brother, or sister with an immune system problem?   No   In the past 3 months, have you taken medications that affect  your immune system, such as prednisone, other steroids, or anticancer drugs; drugs for the treatment of rheumatoid arthritis, Crohn s disease, or psoriasis; or have you had radiation treatments?   No   Have you had a seizure, or a brain or other nervous system problem?   No   During the past year, have you received a transfusion of blood or blood    products, or been given immune (gamma) globulin or antiviral drug?   No   For women: Are you pregnant or is there a chance you could become       pregnant during the next month?   No   Have you received any vaccinations in the past 4 weeks?   No     Immunization questionnaire was positive for at least one answer.  Notified       Patient instructed to remain in clinic for 15 minutes afterwards, and to report  any adverse reactions.     Screening performed by Tomi Nash MA on 11/26/2024 at 3:09 PM.

## 2024-11-26 NOTE — PROGRESS NOTES
"Patient walked in to clinic to discuss ongoing mild shortness of breath, request for albuterol inhaler refill.    Patient is non-toxic appearing, alert, appears well-perfused. Complains of ongoing SOB with a cough for the past 1-2 weeks. No obvious sick contacts, covid negative with home testing. Feels like she cannot get a full breath in. Otherwise afebrile.    Patient has a distant hx of asthma treated with daily budeonide nebs and albuterol HFA PRN. Hx of mild cardiomegaly, follows with cardiology with a workup of Echocardiogram 6/6/24 showing \"mildly dilated left ventricle with LVEF of 50 to 55%, normal right ventricular size and systolic function, no valvular disease. ECG has no significant abnormality.\"    Pulse 88   Resp 18   SpO2 96%     Limited clinic exam of heart and lungs performed (over clothing)    Lungs sound clear all fields. No obvious adventitious sounds.    Heart demonstrates s1,s2, regular, consistent rate.    Given patient's history as well as ongoing shortness of breath recommended evaluation today.  PCP currently out of office and no visits are available at our location.  However, writer was able to schedule the patient for an office visit within the hour at another [place] primary care location. Patient to present to ED with significant worsening of symptoms.     Pete Felipe RN     [facility]    "

## 2024-12-08 ASSESSMENT — ENCOUNTER SYMPTOMS
SHORTNESS OF BREATH: 1
ABDOMINAL PAIN: 0
CHILLS: 0
HEMATURIA: 0
SEIZURES: 0
EYE PAIN: 0
COLOR CHANGE: 0
SORE THROAT: 0
VOMITING: 0
DYSURIA: 0
FATIGUE: 1
BACK PAIN: 0
PALPITATIONS: 0
ARTHRALGIAS: 0
FEVER: 0

## 2025-01-03 ENCOUNTER — TRANSFERRED RECORDS (OUTPATIENT)
Dept: HEALTH INFORMATION MANAGEMENT | Facility: CLINIC | Age: 60
End: 2025-01-03
Payer: COMMERCIAL

## 2025-05-08 ENCOUNTER — PATIENT OUTREACH (OUTPATIENT)
Dept: CARE COORDINATION | Facility: CLINIC | Age: 60
End: 2025-05-08
Payer: COMMERCIAL

## 2025-05-17 ENCOUNTER — HEALTH MAINTENANCE LETTER (OUTPATIENT)
Age: 60
End: 2025-05-17

## 2025-06-10 ENCOUNTER — ANCILLARY PROCEDURE (OUTPATIENT)
Dept: MAMMOGRAPHY | Facility: HOSPITAL | Age: 60
End: 2025-06-10
Attending: FAMILY MEDICINE
Payer: COMMERCIAL

## 2025-06-10 DIAGNOSIS — Z12.31 VISIT FOR SCREENING MAMMOGRAM: ICD-10-CM

## 2025-06-10 PROCEDURE — 77067 SCR MAMMO BI INCL CAD: CPT

## 2025-06-16 ENCOUNTER — OFFICE VISIT (OUTPATIENT)
Dept: FAMILY MEDICINE | Facility: CLINIC | Age: 60
End: 2025-06-16
Payer: COMMERCIAL

## 2025-06-16 ENCOUNTER — ANCILLARY PROCEDURE (OUTPATIENT)
Dept: GENERAL RADIOLOGY | Facility: CLINIC | Age: 60
End: 2025-06-16
Attending: FAMILY MEDICINE
Payer: COMMERCIAL

## 2025-06-16 VITALS
WEIGHT: 137.4 LBS | HEIGHT: 65 IN | SYSTOLIC BLOOD PRESSURE: 105 MMHG | OXYGEN SATURATION: 97 % | BODY MASS INDEX: 22.89 KG/M2 | RESPIRATION RATE: 16 BRPM | DIASTOLIC BLOOD PRESSURE: 54 MMHG | HEART RATE: 76 BPM

## 2025-06-16 DIAGNOSIS — Z13.220 LIPID SCREENING: ICD-10-CM

## 2025-06-16 DIAGNOSIS — M25.561 CHRONIC PAIN OF BOTH KNEES: ICD-10-CM

## 2025-06-16 DIAGNOSIS — R63.2 POLYPHAGIA: ICD-10-CM

## 2025-06-16 DIAGNOSIS — R63.4 WEIGHT LOSS: ICD-10-CM

## 2025-06-16 DIAGNOSIS — Z00.00 ROUTINE GENERAL MEDICAL EXAMINATION AT A HEALTH CARE FACILITY: Primary | ICD-10-CM

## 2025-06-16 DIAGNOSIS — M25.562 CHRONIC PAIN OF BOTH KNEES: ICD-10-CM

## 2025-06-16 DIAGNOSIS — R53.83 OTHER FATIGUE: ICD-10-CM

## 2025-06-16 DIAGNOSIS — F41.9 ANXIETY AND DEPRESSION: ICD-10-CM

## 2025-06-16 DIAGNOSIS — G89.29 CHRONIC PAIN OF BOTH KNEES: ICD-10-CM

## 2025-06-16 DIAGNOSIS — F32.A ANXIETY AND DEPRESSION: ICD-10-CM

## 2025-06-16 LAB
BASOPHILS # BLD AUTO: 0 10E3/UL (ref 0–0.2)
BASOPHILS NFR BLD AUTO: 1 %
EOSINOPHIL # BLD AUTO: 0.8 10E3/UL (ref 0–0.7)
EOSINOPHIL NFR BLD AUTO: 17 %
ERYTHROCYTE [DISTWIDTH] IN BLOOD BY AUTOMATED COUNT: 12.9 % (ref 10–15)
EST. AVERAGE GLUCOSE BLD GHB EST-MCNC: 108 MG/DL
HBA1C MFR BLD: 5.4 % (ref 0–5.6)
HCT VFR BLD AUTO: 42.7 % (ref 35–47)
HGB BLD-MCNC: 14.2 G/DL (ref 11.7–15.7)
IMM GRANULOCYTES # BLD: 0 10E3/UL
IMM GRANULOCYTES NFR BLD: 0 %
LYMPHOCYTES # BLD AUTO: 1.6 10E3/UL (ref 0.8–5.3)
LYMPHOCYTES NFR BLD AUTO: 33 %
MCH RBC QN AUTO: 30.4 PG (ref 26.5–33)
MCHC RBC AUTO-ENTMCNC: 33.3 G/DL (ref 31.5–36.5)
MCV RBC AUTO: 91 FL (ref 78–100)
MONOCYTES # BLD AUTO: 0.4 10E3/UL (ref 0–1.3)
MONOCYTES NFR BLD AUTO: 7 %
NEUTROPHILS # BLD AUTO: 2.1 10E3/UL (ref 1.6–8.3)
NEUTROPHILS NFR BLD AUTO: 43 %
PLATELET # BLD AUTO: 197 10E3/UL (ref 150–450)
RBC # BLD AUTO: 4.67 10E6/UL (ref 3.8–5.2)
WBC # BLD AUTO: 4.9 10E3/UL (ref 4–11)

## 2025-06-16 PROCEDURE — 80053 COMPREHEN METABOLIC PANEL: CPT | Performed by: FAMILY MEDICINE

## 2025-06-16 PROCEDURE — 90677 PCV20 VACCINE IM: CPT | Performed by: FAMILY MEDICINE

## 2025-06-16 PROCEDURE — 73560 X-RAY EXAM OF KNEE 1 OR 2: CPT | Mod: TC | Performed by: RADIOLOGY

## 2025-06-16 PROCEDURE — 3078F DIAST BP <80 MM HG: CPT | Performed by: FAMILY MEDICINE

## 2025-06-16 PROCEDURE — 99396 PREV VISIT EST AGE 40-64: CPT | Mod: 25 | Performed by: FAMILY MEDICINE

## 2025-06-16 PROCEDURE — 80061 LIPID PANEL: CPT | Performed by: FAMILY MEDICINE

## 2025-06-16 PROCEDURE — G2211 COMPLEX E/M VISIT ADD ON: HCPCS | Performed by: FAMILY MEDICINE

## 2025-06-16 PROCEDURE — 99214 OFFICE O/P EST MOD 30 MIN: CPT | Mod: 25 | Performed by: FAMILY MEDICINE

## 2025-06-16 PROCEDURE — 83036 HEMOGLOBIN GLYCOSYLATED A1C: CPT | Performed by: FAMILY MEDICINE

## 2025-06-16 PROCEDURE — 85025 COMPLETE CBC W/AUTO DIFF WBC: CPT | Performed by: FAMILY MEDICINE

## 2025-06-16 PROCEDURE — 90471 IMMUNIZATION ADMIN: CPT | Performed by: FAMILY MEDICINE

## 2025-06-16 PROCEDURE — 84443 ASSAY THYROID STIM HORMONE: CPT | Performed by: FAMILY MEDICINE

## 2025-06-16 PROCEDURE — 71046 X-RAY EXAM CHEST 2 VIEWS: CPT | Mod: TC | Performed by: RADIOLOGY

## 2025-06-16 PROCEDURE — 36415 COLL VENOUS BLD VENIPUNCTURE: CPT | Performed by: FAMILY MEDICINE

## 2025-06-16 PROCEDURE — 3074F SYST BP LT 130 MM HG: CPT | Performed by: FAMILY MEDICINE

## 2025-06-16 SDOH — HEALTH STABILITY: PHYSICAL HEALTH: ON AVERAGE, HOW MANY DAYS PER WEEK DO YOU ENGAGE IN MODERATE TO STRENUOUS EXERCISE (LIKE A BRISK WALK)?: 3 DAYS

## 2025-06-16 SDOH — HEALTH STABILITY: PHYSICAL HEALTH: ON AVERAGE, HOW MANY MINUTES DO YOU ENGAGE IN EXERCISE AT THIS LEVEL?: 30 MIN

## 2025-06-16 ASSESSMENT — SOCIAL DETERMINANTS OF HEALTH (SDOH): HOW OFTEN DO YOU GET TOGETHER WITH FRIENDS OR RELATIVES?: ONCE A WEEK

## 2025-06-16 NOTE — PATIENT INSTRUCTIONS
Patient Education   Preventive Care Advice   This is general advice given by our system to help you stay healthy. However, your care team may have specific advice just for you. Please talk to your care team about your preventive care needs.  Nutrition  Eat 5 or more servings of fruits and vegetables each day.  Try wheat bread, brown rice and whole grain pasta (instead of white bread, rice, and pasta).  Get enough calcium and vitamin D. Check the label on foods and aim for 100% of the RDA (recommended daily allowance).  Lifestyle  Exercise at least 150 minutes each week  (30 minutes a day, 5 days a week).  Do muscle strengthening activities 2 days a week. These help control your weight and prevent disease.  No smoking.  Wear sunscreen to prevent skin cancer.  Have a dental exam and cleaning every 6 months.  Yearly exams  See your health care team every year to talk about:  Any changes in your health.  Any medicines your care team has prescribed.  Preventive care, family planning, and ways to prevent chronic diseases.  Shots (vaccines)   HPV shots (up to age 26), if you've never had them before.  Hepatitis B shots (up to age 59), if you've never had them before.  COVID-19 shot: Get this shot when it's due.  Flu shot: Get a flu shot every year.  Tetanus shot: Get a tetanus shot every 10 years.  Pneumococcal, hepatitis A, and RSV shots: Ask your care team if you need these based on your risk.  Shingles shot (for age 50 and up)  General health tests  Diabetes screening:  Starting at age 35, Get screened for diabetes at least every 3 years.  If you are younger than age 35, ask your care team if you should be screened for diabetes.  Cholesterol test: At age 39, start having a cholesterol test every 5 years, or more often if advised.  Bone density scan (DEXA): At age 50, ask your care team if you should have this scan for osteoporosis (brittle bones).  Hepatitis C: Get tested at least once in your life.  STIs (sexually  transmitted infections)  Before age 24: Ask your care team if you should be screened for STIs.  After age 24: Get screened for STIs if you're at risk. You are at risk for STIs (including HIV) if:  You are sexually active with more than one person.  You don't use condoms every time.  You or a partner was diagnosed with a sexually transmitted infection.  If you are at risk for HIV, ask about PrEP medicine to prevent HIV.  Get tested for HIV at least once in your life, whether you are at risk for HIV or not.  Cancer screening tests  Cervical cancer screening: If you have a cervix, begin getting regular cervical cancer screening tests starting at age 21.  Breast cancer scan (mammogram): If you've ever had breasts, begin having regular mammograms starting at age 40. This is a scan to check for breast cancer.  Colon cancer screening: It is important to start screening for colon cancer at age 45.  Have a colonoscopy test every 10 years (or more often if you're at risk) Or, ask your provider about stool tests like a FIT test every year or Cologuard test every 3 years.  To learn more about your testing options, visit:   .  For help making a decision, visit:   https://bit.ly/kj30953.  Prostate cancer screening test: If you have a prostate, ask your care team if a prostate cancer screening test (PSA) at age 55 is right for you.  Lung cancer screening: If you are a current or former smoker ages 50 to 80, ask your care team if ongoing lung cancer screenings are right for you.  For informational purposes only. Not to replace the advice of your health care provider. Copyright   2023 [employer] All rights reserved. Clinically reviewed by the [facility]. [employer] 570990 - REV 01/24.

## 2025-06-16 NOTE — PROGRESS NOTES
Preventive Care Visit  [facility]  Dina Almaguer MD, Family Medicine  Jun 16, 2025      Assessment & Plan     ICD-10-CM    1. Routine general medical examination at a health care facility  Z00.00       2. Chronic pain of both knees  M25.561 XR Knee Bilateral 1/2 Views    M25.562     G89.29       3. Weight loss  R63.4 Comprehensive metabolic panel (BMP + Alb, Alk Phos, ALT, AST, Total. Bili, TP)     TSH with free T4 reflex     CBC with platelets and differential     XR Chest 2 Views     Hemoglobin A1c      4. Lipid screening  Z13.220 Lipid panel reflex to direct LDL Fasting      5. Other fatigue  R53.83 Hemoglobin A1c      6. Anxiety and depression  F41.9     F32.A       7. Polyphagia  R63.2 Hemoglobin A1c        Patient presents today for annual wellness exam.  She quit drinking in January 2024 and lost a lot of weight.  However she is continuing to lose further weight and informs me that she eats a lot.  She is hungry most of the time  She also endorses some fatigue.  She describes that she feels weak in the knees that she describes as knee pain.  Her knees do not hurt but when she tries to squat she is unable to do as she feels they do not have enough strength.  Exam is normal today.  Labs as above.  For anxiety and depression, she is on Azra being prescribed by [facility] and she follows with a therapist weekly.  Reviewed her previous echocardiogram with mild noted cardiomyopathy noted.  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  Repeat echocardiogram in 6 months was recommended by cardiology and needs to be pursued.  Patient will self schedule an exam.  For her weight loss, lab and testing as above.  She does describing as feeling hungry and for polyphagia, check A1c.  Check thyroid for weight loss.  Follow-up for weight check with a nurse visit in 3 to 6 months or alert me.  Further workup if continuing to have weight loss.  If stable then we will  monitor.  She verbalizes understanding of the plan.    Wt Readings from Last 7 Encounters:   06/16/25 62.3 kg (137 lb 6.4 oz)   11/26/24 65.3 kg (144 lb)   07/22/24 66.7 kg (147 lb)   05/17/24 68.2 kg (150 lb 6.4 oz)   05/12/23 72.8 kg (160 lb 6.4 oz)   10/24/22 74.3 kg (163 lb 14.4 oz)   10/26/21 71.1 kg (156 lb 12.8 oz)         The longitudinal plan of care for the diagnosis(es)/condition(s) as documented were addressed during this visit. Due to the added complexity in care, I will continue to support Katey in the subsequent management and with ongoing continuity of care.      Counseling  Appropriate preventive services were addressed with this patient via screening, questionnaire, or discussion as appropriate for fall prevention, nutrition, physical activity, Tobacco-use cessation, social engagement, weight loss and cognition.  Checklist reviewing preventive services available has been given to the patient.  Reviewed patient's diet, addressing concerns and/or questions.   She is at risk for lack of exercise and has been provided with information to increase physical activity for the benefit of her well-being.   The patient was instructed to see the dentist every 6 months.       Follow-up    Follow-up Visit   Expected date:  Jun 16, 2026 (Approximate)      Follow Up Appointment Details:     Follow-up with whom?: PCP    Follow-Up for what?: Adult Preventive    How?: In Person                 Subjective   Katey is a 59 year old, presenting for the following:  Physical (Pt is fasting today, wanting thyroid checked- feels exhausted everyday, losing weight and not trying too- wondering if something is going on. )        6/16/2025    11:54 AM   Additional Questions   Roomed by Farideh Patton CMA          HPI    Advance Care Planning    Discussed advance care planning with patient; informed AVS has link to [facility].        6/16/2025   General Health   How would you rate your overall physical health? Good    Feel stress (tense, anxious, or unable to sleep) Only a little   (!) STRESS CONCERN      6/16/2025   Nutrition   Three or more servings of calcium each day? Yes   Diet: Regular (no restrictions)   How many servings of fruit and vegetables per day? (!) 2-3   How many sweetened beverages each day? 0-1         6/16/2025   Exercise   Days per week of moderate/strenous exercise 3 days   Average minutes spent exercising at this level 30 min         6/16/2025   Social Factors   Frequency of gathering with friends or relatives Once a week   Worry food won't last until get money to buy more No   Food not last or not have enough money for food? No   Do you have housing? (Housing is defined as stable permanent housing and does not include staying outside in a car, in a tent, in an abandoned building, in an overnight shelter, or couch-surfing.) Yes   Are you worried about losing your housing? No   Lack of transportation? No   Unable to get utilities (heat,electricity)? No         6/16/2025   Fall Risk   Fallen 2 or more times in the past year? No   Trouble with walking or balance? No          6/16/2025   Dental   Dentist two times every year? (!) NO         Today's PHQ-2 Score:       6/16/2025    10:46 AM   PHQ-2 ( 1999 [employer])   Q1: Little interest or pleasure in doing things 1   Q2: Feeling down, depressed or hopeless 0   PHQ-2 Score 1    Q1: Little interest or pleasure in doing things Several days   Q2: Feeling down, depressed or hopeless Not at all   PHQ-2 Score 1       Patient-reported           6/16/2025   Substance Use   Alcohol more than 3/day or more than 7/wk Not Applicable   Do you use any other substances recreationally? No     Social History     Tobacco Use    Smoking status: Never     Passive exposure: Never    Smokeless tobacco: Never   Vaping Use    Vaping status: Never Used   Substance Use Topics    Alcohol use: Not Currently    Drug use: No           6/10/2025   LAST FHS-7 RESULTS   1st degree relative  breast or ovarian cancer No   Any relative bilateral breast cancer No   Any male have breast cancer No   Any ONE woman have BOTH breast AND ovarian cancer No   Any woman with breast cancer before 50yrs No   2 or more relatives with breast AND/OR ovarian cancer No   2 or more relatives with breast AND/OR bowel cancer No        Mammogram Screening - Mammogram every 1-2 years updated in Health Maintenance based on mutual decision making        6/16/2025   STI Screening   New sexual partner(s) since last STI/HIV test? No     History of abnormal Pap smear: No - age 30-64 HPV with reflex Pap every 5 years recommended        Latest Ref Rng & Units 10/26/2021     1:16 PM 10/25/2016     5:14 PM   PAP / HPV   PAP  Negative for Intraepithelial Lesion or Malignancy (NILM)  Negative for squamous intraepithelial lesion or malignancy  Electronically signed by Tasha Eduardo CT (ASCP) on 11/1/2016 at 11:15 AM      HPV 16 DNA Negative Negative     HPV 18 DNA Negative Negative     Other HR HPV Negative Negative       ASCVD Risk   The 10-year ASCVD risk score (Maria Victoria DK, et al., 2019) is: 1.6%    Values used to calculate the score:      Age: 59 years      Sex: Female      Is Non- : No      Diabetic: No      Tobacco smoker: No      Systolic Blood Pressure: 105 mmHg      Is BP treated: No      HDL Cholesterol: 65 mg/dL      Total Cholesterol: 231 mg/dL           Reviewed and updated as needed this visit by Provider                    Past Medical History:   Diagnosis Date    Allergic rhinitis     Anxiety     Chronic sinusitis     Depressive disorder     Hearing problem     Nasal polyps     Sinus problem     Uncomplicated asthma      Past Surgical History:   Procedure Laterality Date    ANTROSTOMY NASAL Bilateral 8/11/2017    Procedure: ANTROSTOMY NASAL;;  Surgeon: Dorothy Garcia MD;  Location:  OR    ENDOSCOPIC POLYPECTOMY NASAL Bilateral 8/11/2017    Procedure: ENDOSCOPIC POLYPECTOMY NASAL;   Bilateral Polypectomy, Bilateral Maxillary Antrostomy, Ethmoidectomy;  Surgeon: Dorothy Garcia MD;  Location: [facility] OR    NASAL POLYP SURGERY      NASAL/SINUS POLYPECTOMY      NO HISTORY OF SURGERY       BP Readings from Last 3 Encounters:   06/16/25 105/54   11/26/24 93/60   07/22/24 100/54    Wt Readings from Last 3 Encounters:   06/16/25 62.3 kg (137 lb 6.4 oz)   11/26/24 65.3 kg (144 lb)   07/22/24 66.7 kg (147 lb)                  Patient Active Problem List   Diagnosis    Allergic rhinitis    Benign neoplasm of ascending colon    Hemorrhoids    Anxiety and depression    Weight loss    Chronic pain of both knees     Past Surgical History:   Procedure Laterality Date    ANTROSTOMY NASAL Bilateral 8/11/2017    Procedure: ANTROSTOMY NASAL;;  Surgeon: Dorothy Garcia MD;  Location: [facility] OR    ENDOSCOPIC POLYPECTOMY NASAL Bilateral 8/11/2017    Procedure: ENDOSCOPIC POLYPECTOMY NASAL;  Bilateral Polypectomy, Bilateral Maxillary Antrostomy, Ethmoidectomy;  Surgeon: Dorothy Garcia MD;  Location: [facility] OR    NASAL POLYP SURGERY      NASAL/SINUS POLYPECTOMY      NO HISTORY OF SURGERY         Social History     Tobacco Use    Smoking status: Never     Passive exposure: Never    Smokeless tobacco: Never   Substance Use Topics    Alcohol use: Not Currently     Family History   Problem Relation Age of Onset    Heart Disease Mother     Atrial fibrillation Mother     Heart Failure Mother     Coronary Artery Disease Mother         Afib    No Known Problems Father     Lung Cancer Maternal Grandmother         smoker    Heart Disease Maternal Grandfather     Breast Cancer No family hx of     Ovarian Cancer No family hx of          Current Outpatient Medications   Medication Sig Dispense Refill    biotin 1000 MCG TABS tablet Take 1,000 mcg by mouth daily      fexofenadine (ALLEGRA ALLERGY) 180 MG tablet Take 180 mg by mouth daily      FLUoxetine (PROZAC) 10 MG capsule Take 1 capsule by mouth every 24 hours      Multiple Vitamin  "(MULTIVITAMIN ADULT PO) Take 1 tablet by mouth daily      Probiotic Product (PROBIOTIC BLEND PO) Take 1 capsule by mouth daily      triamcinolone (NASACORT) 55 MCG/ACT Inhaler Spray 2 sprays into both nostrils daily      Vitamin D3 (CHOLECALCIFEROL) 25 mcg (1000 units) tablet Take 1 tablet by mouth daily      albuterol (PROAIR HFA/PROVENTIL HFA/VENTOLIN HFA) 108 (90 Base) MCG/ACT inhaler Inhale 2 puffs into the lungs every 4 hours as needed for shortness of breath, wheezing or cough. (Patient not taking: Reported on 6/16/2025) 18 g 1         Review of Systems  Constitutional, HEENT, cardiovascular, pulmonary, GI, , musculoskeletal, neuro, skin, endocrine and psych systems are negative, except as otherwise noted.     Objective    Exam  /54 (BP Location: Left arm, Patient Position: Sitting, Cuff Size: Adult Regular)   Pulse 76   Resp 16   Ht 1.651 m (5' 5\")   Wt 62.3 kg (137 lb 6.4 oz)   SpO2 97%   BMI 22.86 kg/m     Estimated body mass index is 22.86 kg/m  as calculated from the following:    Height as of this encounter: 1.651 m (5' 5\").    Weight as of this encounter: 62.3 kg (137 lb 6.4 oz).    Physical Exam  GENERAL: alert and no distress  EYES: Eyes grossly normal to inspection, PERRL and conjunctivae and sclerae normal  HENT: ear canals and TM's normal, nose and mouth without ulcers or lesions  NECK: no adenopathy, no asymmetry, masses, or scars  RESP: lungs clear to auscultation - no rales, rhonchi or wheezes  BREAST: normal without masses, tenderness or nipple discharge and no palpable axillary masses or adenopathy  CV: regular rate and rhythm, normal S1 S2, no S3 or S4, no murmur, click or rub, no peripheral edema  ABDOMEN: soft, nontender, no hepatosplenomegaly, no masses and bowel sounds normal  MS: no gross musculoskeletal defects noted, no edema        Signed Electronically by: Dina Almaguer MD    "

## 2025-06-17 ENCOUNTER — RESULTS FOLLOW-UP (OUTPATIENT)
Dept: FAMILY MEDICINE | Facility: CLINIC | Age: 60
End: 2025-06-17

## 2025-06-17 LAB
ALBUMIN SERPL BCG-MCNC: 4.4 G/DL (ref 3.5–5.2)
ALP SERPL-CCNC: 53 U/L (ref 40–150)
ALT SERPL W P-5'-P-CCNC: 24 U/L (ref 0–50)
ANION GAP SERPL CALCULATED.3IONS-SCNC: 8 MMOL/L (ref 7–15)
AST SERPL W P-5'-P-CCNC: 29 U/L (ref 0–45)
BILIRUB SERPL-MCNC: 0.5 MG/DL
BUN SERPL-MCNC: 16.5 MG/DL (ref 6–20)
CALCIUM SERPL-MCNC: 9.3 MG/DL (ref 8.8–10.4)
CHLORIDE SERPL-SCNC: 101 MMOL/L (ref 98–107)
CHOLEST SERPL-MCNC: 252 MG/DL
CREAT SERPL-MCNC: 0.81 MG/DL (ref 0.51–0.95)
EGFRCR SERPLBLD CKD-EPI 2021: 84 ML/MIN/1.73M2
FASTING STATUS PATIENT QL REPORTED: YES
FASTING STATUS PATIENT QL REPORTED: YES
GLUCOSE SERPL-MCNC: 96 MG/DL (ref 70–99)
HCO3 SERPL-SCNC: 30 MMOL/L (ref 22–29)
HDLC SERPL-MCNC: 72 MG/DL
LDLC SERPL CALC-MCNC: 164 MG/DL
NONHDLC SERPL-MCNC: 180 MG/DL
POTASSIUM SERPL-SCNC: 4 MMOL/L (ref 3.4–5.3)
PROT SERPL-MCNC: 7.2 G/DL (ref 6.4–8.3)
SODIUM SERPL-SCNC: 139 MMOL/L (ref 135–145)
TRIGL SERPL-MCNC: 80 MG/DL
TSH SERPL DL<=0.005 MIU/L-ACNC: 2.29 UIU/ML (ref 0.3–4.2)

## 2025-08-04 ENCOUNTER — PATIENT OUTREACH (OUTPATIENT)
Dept: CARE COORDINATION | Facility: CLINIC | Age: 60
End: 2025-08-04
Payer: COMMERCIAL

## 2025-08-06 ENCOUNTER — PATIENT OUTREACH (OUTPATIENT)
Dept: CARE COORDINATION | Facility: CLINIC | Age: 60
End: 2025-08-06
Payer: COMMERCIAL

## 2025-08-11 ENCOUNTER — OFFICE VISIT (OUTPATIENT)
Dept: FAMILY MEDICINE | Facility: CLINIC | Age: 60
End: 2025-08-11
Payer: COMMERCIAL

## 2025-08-11 ENCOUNTER — TELEPHONE (OUTPATIENT)
Dept: ENDOCRINOLOGY | Facility: CLINIC | Age: 60
End: 2025-08-11

## 2025-08-11 VITALS
TEMPERATURE: 98.6 F | RESPIRATION RATE: 14 BRPM | DIASTOLIC BLOOD PRESSURE: 67 MMHG | HEART RATE: 69 BPM | BODY MASS INDEX: 23.43 KG/M2 | WEIGHT: 140.6 LBS | HEIGHT: 65 IN | OXYGEN SATURATION: 97 % | SYSTOLIC BLOOD PRESSURE: 87 MMHG

## 2025-08-11 DIAGNOSIS — E16.2 HYPOGLYCEMIA: ICD-10-CM

## 2025-08-11 DIAGNOSIS — R53.83 OTHER FATIGUE: ICD-10-CM

## 2025-08-11 DIAGNOSIS — R63.4 WEIGHT LOSS: ICD-10-CM

## 2025-08-11 DIAGNOSIS — I95.9 HYPOTENSION, UNSPECIFIED HYPOTENSION TYPE: Primary | ICD-10-CM

## 2025-08-11 PROCEDURE — 99214 OFFICE O/P EST MOD 30 MIN: CPT | Performed by: FAMILY MEDICINE

## 2025-08-11 PROCEDURE — G2211 COMPLEX E/M VISIT ADD ON: HCPCS | Performed by: FAMILY MEDICINE

## 2025-08-11 PROCEDURE — 3074F SYST BP LT 130 MM HG: CPT | Performed by: FAMILY MEDICINE

## 2025-08-11 PROCEDURE — 3078F DIAST BP <80 MM HG: CPT | Performed by: FAMILY MEDICINE

## 2025-08-18 ENCOUNTER — LAB (OUTPATIENT)
Dept: LAB | Facility: CLINIC | Age: 60
End: 2025-08-18
Payer: COMMERCIAL

## 2025-08-18 DIAGNOSIS — I95.9 HYPOTENSION, UNSPECIFIED HYPOTENSION TYPE: ICD-10-CM

## 2025-08-18 DIAGNOSIS — R63.4 WEIGHT LOSS: ICD-10-CM

## 2025-08-18 DIAGNOSIS — E16.2 HYPOGLYCEMIA: ICD-10-CM

## 2025-08-18 LAB
ALBUMIN SERPL BCG-MCNC: 4.2 G/DL (ref 3.5–5.2)
ALP SERPL-CCNC: 49 U/L (ref 40–150)
ALT SERPL W P-5'-P-CCNC: 23 U/L (ref 0–50)
ANION GAP SERPL CALCULATED.3IONS-SCNC: 6 MMOL/L (ref 7–15)
AST SERPL W P-5'-P-CCNC: 28 U/L (ref 0–45)
BASOPHILS # BLD AUTO: 0.04 10E3/UL (ref 0–0.2)
BASOPHILS NFR BLD AUTO: 0.8 %
BILIRUB SERPL-MCNC: 0.4 MG/DL
BUN SERPL-MCNC: 18.3 MG/DL (ref 6–20)
CALCIUM SERPL-MCNC: 9.5 MG/DL (ref 8.8–10.4)
CHLORIDE SERPL-SCNC: 104 MMOL/L (ref 98–107)
CORTIS SERPL-MCNC: 21.2 UG/DL
CREAT SERPL-MCNC: 0.87 MG/DL (ref 0.51–0.95)
EGFRCR SERPLBLD CKD-EPI 2021: 77 ML/MIN/1.73M2
EOSINOPHIL # BLD AUTO: 0.72 10E3/UL (ref 0–0.7)
EOSINOPHIL NFR BLD AUTO: 14.2 %
ERYTHROCYTE [DISTWIDTH] IN BLOOD BY AUTOMATED COUNT: 13 % (ref 10–15)
GLUCOSE SERPL-MCNC: 97 MG/DL (ref 70–99)
HCO3 SERPL-SCNC: 32 MMOL/L (ref 22–29)
HCT VFR BLD AUTO: 42.5 % (ref 35–47)
HGB BLD-MCNC: 13.9 G/DL (ref 11.7–15.7)
HIV 1+2 AB+HIV1 P24 AG SERPL QL IA: NONREACTIVE
IMM GRANULOCYTES # BLD: <0.04 10E3/UL
IMM GRANULOCYTES NFR BLD: 0 %
LYMPHOCYTES # BLD AUTO: 1.82 10E3/UL (ref 0.8–5.3)
LYMPHOCYTES NFR BLD AUTO: 35.8 %
MCH RBC QN AUTO: 30.2 PG (ref 26.5–33)
MCHC RBC AUTO-ENTMCNC: 32.7 G/DL (ref 31.5–36.5)
MCV RBC AUTO: 92.4 FL (ref 78–100)
MONOCYTES # BLD AUTO: 0.27 10E3/UL (ref 0–1.3)
MONOCYTES NFR BLD AUTO: 5.3 %
NEUTROPHILS # BLD AUTO: 2.23 10E3/UL (ref 1.6–8.3)
NEUTROPHILS NFR BLD AUTO: 43.9 %
PLATELET # BLD AUTO: 199 10E3/UL (ref 150–450)
POTASSIUM SERPL-SCNC: 4.5 MMOL/L (ref 3.4–5.3)
PROT SERPL-MCNC: 7 G/DL (ref 6.4–8.3)
RBC # BLD AUTO: 4.6 10E6/UL (ref 3.8–5.2)
SODIUM SERPL-SCNC: 142 MMOL/L (ref 135–145)
WBC # BLD AUTO: 5.08 10E3/UL (ref 4–11)

## 2025-08-18 PROCEDURE — 36415 COLL VENOUS BLD VENIPUNCTURE: CPT

## 2025-08-18 PROCEDURE — 80053 COMPREHEN METABOLIC PANEL: CPT

## 2025-08-18 PROCEDURE — 87389 HIV-1 AG W/HIV-1&-2 AB AG IA: CPT

## 2025-08-18 PROCEDURE — 82627 DEHYDROEPIANDROSTERONE: CPT

## 2025-08-18 PROCEDURE — 82533 TOTAL CORTISOL: CPT

## 2025-08-18 PROCEDURE — 82024 ASSAY OF ACTH: CPT

## 2025-08-18 PROCEDURE — 85025 COMPLETE CBC W/AUTO DIFF WBC: CPT

## 2025-08-19 ENCOUNTER — TRANSFERRED RECORDS (OUTPATIENT)
Dept: HEALTH INFORMATION MANAGEMENT | Facility: CLINIC | Age: 60
End: 2025-08-19
Payer: COMMERCIAL

## 2025-08-19 LAB
ACTH PLAS-MCNC: 34 PG/ML
DHEA-S SERPL-MCNC: 75 UG/DL (ref 35–430)

## 2025-09-02 ENCOUNTER — OFFICE VISIT (OUTPATIENT)
Dept: ENDOCRINOLOGY | Facility: CLINIC | Age: 60
End: 2025-09-02
Attending: FAMILY MEDICINE
Payer: COMMERCIAL

## 2025-09-02 VITALS
DIASTOLIC BLOOD PRESSURE: 71 MMHG | OXYGEN SATURATION: 98 % | HEART RATE: 70 BPM | BODY MASS INDEX: 22.82 KG/M2 | WEIGHT: 142 LBS | HEIGHT: 66 IN | SYSTOLIC BLOOD PRESSURE: 116 MMHG

## 2025-09-02 DIAGNOSIS — E16.2 HYPOGLYCEMIA: Primary | ICD-10-CM

## 2025-09-02 PROCEDURE — 3074F SYST BP LT 130 MM HG: CPT | Performed by: INTERNAL MEDICINE

## 2025-09-02 PROCEDURE — 3078F DIAST BP <80 MM HG: CPT | Performed by: INTERNAL MEDICINE

## 2025-09-02 PROCEDURE — 99204 OFFICE O/P NEW MOD 45 MIN: CPT | Performed by: INTERNAL MEDICINE

## (undated) DEVICE — TUBING IRRIGATOR STRAIGHTSHOT XPS 1895522

## (undated) DEVICE — ESU GROUND PAD ADULT W/CORD E7507

## (undated) DEVICE — SOL NACL 0.9% IRRIG 1000ML BOTTLE 2F7124

## (undated) DEVICE — SPONGE COTTONOID 1/2X3" 80-1407

## (undated) DEVICE — WIPE INSTRUMENT MEROCEL 400200

## (undated) DEVICE — SUCTION MANIFOLD NEPTUNE 2 SYS 1 PORT 702-025-000

## (undated) DEVICE — DRAPE WARMER 66X44" ORS-300

## (undated) DEVICE — GLOVE PROTEXIS POWDER FREE SMT 6.5  2D72PT65X

## (undated) DEVICE — DRAPE U SPLIT 74X120" 29440

## (undated) DEVICE — SPECIMEN TRAP STRYKER NEPTUNE IN-LINE 0700-050-000

## (undated) DEVICE — DECANTER VIAL 2006S

## (undated) DEVICE — TUBING SUCTION 12"X1/4" N612

## (undated) DEVICE — SYR 03ML LL W/O NDL 309657

## (undated) DEVICE — BLADE SHAVER SINUS 3.5MM RAD 60 ROTATE 1883516HRE

## (undated) DEVICE — DRSG HOLDER NASAL DALE 600

## (undated) DEVICE — PACK ENT ENDOSCOPY CUSTOM ASC

## (undated) DEVICE — DRSG NASOPORE FIRM 4CM 5400-020-004

## (undated) DEVICE — APPLICATOR COTTON TIP 6"X2 STERILE LF 6012

## (undated) DEVICE — SYR 30ML LL W/O NDL 302832

## (undated) DEVICE — LINEN TOWEL PACK X5 5464

## (undated) DEVICE — NDL 25GA 2"  8881200441

## (undated) DEVICE — NDL COUNTER 20CT 31142493

## (undated) DEVICE — SYR 10ML LL W/O NDL

## (undated) DEVICE — SOL NACL 0.9% INJ 1000ML BAG 2B1324X

## (undated) DEVICE — SPONGE RAY-TEC 4X8" 7318

## (undated) DEVICE — SOL WATER IRRIG 1000ML BOTTLE 2F7114

## (undated) RX ORDER — DEXAMETHASONE SODIUM PHOSPHATE 4 MG/ML
INJECTION, SOLUTION INTRA-ARTICULAR; INTRALESIONAL; INTRAMUSCULAR; INTRAVENOUS; SOFT TISSUE
Status: DISPENSED
Start: 2017-08-11

## (undated) RX ORDER — OXYMETAZOLINE HYDROCHLORIDE 0.05 G/100ML
SPRAY NASAL
Status: DISPENSED
Start: 2017-08-11

## (undated) RX ORDER — HYDROCODONE BITARTRATE AND ACETAMINOPHEN 5; 325 MG/1; MG/1
TABLET ORAL
Status: DISPENSED
Start: 2017-08-11

## (undated) RX ORDER — PROPOFOL 10 MG/ML
INJECTION, EMULSION INTRAVENOUS
Status: DISPENSED
Start: 2017-08-11

## (undated) RX ORDER — GABAPENTIN 300 MG/1
CAPSULE ORAL
Status: DISPENSED
Start: 2017-08-11

## (undated) RX ORDER — ACETAMINOPHEN 325 MG/1
TABLET ORAL
Status: DISPENSED
Start: 2017-08-11

## (undated) RX ORDER — ONDANSETRON 2 MG/ML
INJECTION INTRAMUSCULAR; INTRAVENOUS
Status: DISPENSED
Start: 2017-08-11

## (undated) RX ORDER — FENTANYL CITRATE 50 UG/ML
INJECTION, SOLUTION INTRAMUSCULAR; INTRAVENOUS
Status: DISPENSED
Start: 2017-08-11